# Patient Record
Sex: FEMALE | Race: BLACK OR AFRICAN AMERICAN | Employment: OTHER | URBAN - METROPOLITAN AREA
[De-identification: names, ages, dates, MRNs, and addresses within clinical notes are randomized per-mention and may not be internally consistent; named-entity substitution may affect disease eponyms.]

---

## 2017-03-25 PROBLEM — R26.9 GAIT DISORDER: Status: ACTIVE | Noted: 2017-03-25

## 2017-03-25 PROBLEM — R40.4 TRANSIENT ALTERATION OF AWARENESS: Status: ACTIVE | Noted: 2017-03-25

## 2017-03-25 PROBLEM — B97.89 VIRAL RESPIRATORY ILLNESS: Status: ACTIVE | Noted: 2017-03-25

## 2017-03-25 PROBLEM — J10.1 INFLUENZA B: Status: ACTIVE | Noted: 2017-03-25

## 2017-03-25 PROBLEM — J98.8 VIRAL RESPIRATORY ILLNESS: Status: ACTIVE | Noted: 2017-03-25

## 2017-04-29 PROBLEM — E87.20 LACTIC ACID ACIDOSIS: Status: ACTIVE | Noted: 2017-04-29

## 2017-04-29 PROBLEM — A41.89 SEPSIS DUE TO OTHER ETIOLOGY (HCC): Status: ACTIVE | Noted: 2017-04-29

## 2017-04-29 PROBLEM — N30.00 ACUTE CYSTITIS WITHOUT HEMATURIA: Status: ACTIVE | Noted: 2017-04-29

## 2018-06-04 ENCOUNTER — OFFICE VISIT (OUTPATIENT)
Dept: ENT CLINIC | Age: 83
End: 2018-06-04
Payer: COMMERCIAL

## 2018-06-04 VITALS
WEIGHT: 131.7 LBS | SYSTOLIC BLOOD PRESSURE: 153 MMHG | BODY MASS INDEX: 30.48 KG/M2 | DIASTOLIC BLOOD PRESSURE: 70 MMHG | HEIGHT: 55 IN | HEART RATE: 86 BPM

## 2018-06-04 DIAGNOSIS — C06.9 SQUAMOUS CELL CARCINOMA OF ORAL CAVITY (HCC): Primary | ICD-10-CM

## 2018-06-04 PROCEDURE — 99213 OFFICE O/P EST LOW 20 MIN: CPT | Performed by: OTOLARYNGOLOGY

## 2018-06-04 PROCEDURE — 1036F TOBACCO NON-USER: CPT | Performed by: OTOLARYNGOLOGY

## 2018-06-04 PROCEDURE — G8417 CALC BMI ABV UP PARAM F/U: HCPCS | Performed by: OTOLARYNGOLOGY

## 2018-06-04 PROCEDURE — 4040F PNEUMOC VAC/ADMIN/RCVD: CPT | Performed by: OTOLARYNGOLOGY

## 2018-06-04 PROCEDURE — 1090F PRES/ABSN URINE INCON ASSESS: CPT | Performed by: OTOLARYNGOLOGY

## 2018-06-04 PROCEDURE — 1123F ACP DISCUSS/DSCN MKR DOCD: CPT | Performed by: OTOLARYNGOLOGY

## 2018-06-04 PROCEDURE — G8427 DOCREV CUR MEDS BY ELIG CLIN: HCPCS | Performed by: OTOLARYNGOLOGY

## 2018-06-07 ASSESSMENT — ENCOUNTER SYMPTOMS
SHORTNESS OF BREATH: 0
VOMITING: 0
BLURRED VISION: 0
HEARTBURN: 0
COUGH: 0
DOUBLE VISION: 0

## 2018-06-16 ENCOUNTER — HOSPITAL ENCOUNTER (INPATIENT)
Age: 83
LOS: 4 days | Discharge: HOME HEALTH | DRG: 153 | End: 2018-06-20
Attending: EMERGENCY MEDICINE | Admitting: INTERNAL MEDICINE
Payer: COMMERCIAL

## 2018-06-16 ENCOUNTER — APPOINTMENT (OUTPATIENT)
Dept: GENERAL RADIOLOGY | Age: 83
DRG: 153 | End: 2018-06-16
Payer: COMMERCIAL

## 2018-06-16 ENCOUNTER — APPOINTMENT (OUTPATIENT)
Dept: CT IMAGING | Age: 83
DRG: 153 | End: 2018-06-16
Payer: COMMERCIAL

## 2018-06-16 DIAGNOSIS — J01.00 ACUTE NON-RECURRENT MAXILLARY SINUSITIS: Primary | ICD-10-CM

## 2018-06-16 DIAGNOSIS — R53.83 FATIGUE, UNSPECIFIED TYPE: ICD-10-CM

## 2018-06-16 PROBLEM — H70.90 MASTOIDITIS: Status: ACTIVE | Noted: 2018-06-16

## 2018-06-16 PROBLEM — J01.90 SINUSITIS, ACUTE: Status: ACTIVE | Noted: 2018-06-16

## 2018-06-16 LAB
ALBUMIN SERPL-MCNC: 2.8 G/DL (ref 3.5–5.2)
ALP BLD-CCNC: 273 U/L (ref 35–104)
ALT SERPL-CCNC: 25 U/L (ref 0–32)
ANION GAP SERPL CALCULATED.3IONS-SCNC: 16 MMOL/L (ref 7–16)
AST SERPL-CCNC: 45 U/L (ref 0–31)
BACTERIA: ABNORMAL /HPF
BASOPHILS ABSOLUTE: 0 E9/L (ref 0–0.2)
BASOPHILS RELATIVE PERCENT: 0 % (ref 0–2)
BILIRUB SERPL-MCNC: 0.5 MG/DL (ref 0–1.2)
BILIRUBIN URINE: NEGATIVE
BLOOD, URINE: NEGATIVE
BUN BLDV-MCNC: 23 MG/DL (ref 8–23)
CALCIUM SERPL-MCNC: 9.1 MG/DL (ref 8.6–10.2)
CHLORIDE BLD-SCNC: 97 MMOL/L (ref 98–107)
CLARITY: CLEAR
CO2: 23 MMOL/L (ref 22–29)
COLOR: YELLOW
CREAT SERPL-MCNC: 1.6 MG/DL (ref 0.5–1)
EKG ATRIAL RATE: 91 BPM
EKG P AXIS: 55 DEGREES
EKG P-R INTERVAL: 142 MS
EKG Q-T INTERVAL: 286 MS
EKG QRS DURATION: 80 MS
EKG QTC CALCULATION (BAZETT): 351 MS
EKG R AXIS: 20 DEGREES
EKG T AXIS: 45 DEGREES
EKG VENTRICULAR RATE: 91 BPM
EOSINOPHILS ABSOLUTE: 0 E9/L (ref 0.05–0.5)
EOSINOPHILS RELATIVE PERCENT: 0 % (ref 0–6)
EPITHELIAL CELLS, UA: ABNORMAL /HPF
GFR AFRICAN AMERICAN: 36
GFR NON-AFRICAN AMERICAN: 36 ML/MIN/1.73
GLUCOSE BLD-MCNC: 137 MG/DL (ref 74–109)
GLUCOSE URINE: NEGATIVE MG/DL
HCT VFR BLD CALC: 38.6 % (ref 34–48)
HEMOGLOBIN: 12.4 G/DL (ref 11.5–15.5)
KETONES, URINE: NEGATIVE MG/DL
LACTIC ACID: 2.9 MMOL/L (ref 0.5–2.2)
LEUKOCYTE ESTERASE, URINE: NEGATIVE
LYMPHOCYTES ABSOLUTE: 2.47 E9/L (ref 1.5–4)
LYMPHOCYTES RELATIVE PERCENT: 12 % (ref 20–42)
MCH RBC QN AUTO: 31.8 PG (ref 26–35)
MCHC RBC AUTO-ENTMCNC: 32.1 % (ref 32–34.5)
MCV RBC AUTO: 99 FL (ref 80–99.9)
MONOCYTES ABSOLUTE: 1.03 E9/L (ref 0.1–0.95)
MONOCYTES RELATIVE PERCENT: 5 % (ref 2–12)
NEUTROPHILS ABSOLUTE: 17.1 E9/L (ref 1.8–7.3)
NEUTROPHILS RELATIVE PERCENT: 83 % (ref 43–80)
NITRITE, URINE: NEGATIVE
PDW BLD-RTO: 12.2 FL (ref 11.5–15)
PH UA: 8 (ref 5–9)
PLATELET # BLD: 178 E9/L (ref 130–450)
PMV BLD AUTO: 10.6 FL (ref 7–12)
POTASSIUM SERPL-SCNC: 4.4 MMOL/L (ref 3.5–5)
PRO-BNP: 3814 PG/ML (ref 0–450)
PROTEIN UA: 100 MG/DL
RBC # BLD: 3.9 E12/L (ref 3.5–5.5)
RBC UA: ABNORMAL /HPF (ref 0–2)
SODIUM BLD-SCNC: 136 MMOL/L (ref 132–146)
SPECIFIC GRAVITY UA: 1.02 (ref 1–1.03)
TOTAL CK: 239 U/L (ref 20–180)
TOTAL PROTEIN: 8.2 G/DL (ref 6.4–8.3)
TOXIC GRANULATION: ABNORMAL
TROPONIN: <0.01 NG/ML (ref 0–0.03)
UROBILINOGEN, URINE: 0.2 E.U./DL
WBC # BLD: 20.6 E9/L (ref 4.5–11.5)
WBC UA: ABNORMAL /HPF (ref 0–5)

## 2018-06-16 PROCEDURE — 6370000000 HC RX 637 (ALT 250 FOR IP): Performed by: EMERGENCY MEDICINE

## 2018-06-16 PROCEDURE — 71045 X-RAY EXAM CHEST 1 VIEW: CPT

## 2018-06-16 PROCEDURE — 80053 COMPREHEN METABOLIC PANEL: CPT

## 2018-06-16 PROCEDURE — 36415 COLL VENOUS BLD VENIPUNCTURE: CPT

## 2018-06-16 PROCEDURE — 81001 URINALYSIS AUTO W/SCOPE: CPT

## 2018-06-16 PROCEDURE — 82550 ASSAY OF CK (CPK): CPT

## 2018-06-16 PROCEDURE — 83605 ASSAY OF LACTIC ACID: CPT

## 2018-06-16 PROCEDURE — 2580000003 HC RX 258: Performed by: NURSE PRACTITIONER

## 2018-06-16 PROCEDURE — 87040 BLOOD CULTURE FOR BACTERIA: CPT

## 2018-06-16 PROCEDURE — 84484 ASSAY OF TROPONIN QUANT: CPT

## 2018-06-16 PROCEDURE — 6360000002 HC RX W HCPCS: Performed by: EMERGENCY MEDICINE

## 2018-06-16 PROCEDURE — 70450 CT HEAD/BRAIN W/O DYE: CPT

## 2018-06-16 PROCEDURE — 2580000003 HC RX 258: Performed by: EMERGENCY MEDICINE

## 2018-06-16 PROCEDURE — 85025 COMPLETE CBC W/AUTO DIFF WBC: CPT

## 2018-06-16 PROCEDURE — 1200000000 HC SEMI PRIVATE

## 2018-06-16 PROCEDURE — 87088 URINE BACTERIA CULTURE: CPT

## 2018-06-16 PROCEDURE — 83880 ASSAY OF NATRIURETIC PEPTIDE: CPT

## 2018-06-16 PROCEDURE — 99285 EMERGENCY DEPT VISIT HI MDM: CPT

## 2018-06-16 RX ORDER — GABAPENTIN 100 MG/1
100 CAPSULE ORAL NIGHTLY
Status: DISCONTINUED | OUTPATIENT
Start: 2018-06-16 | End: 2018-06-20 | Stop reason: HOSPADM

## 2018-06-16 RX ORDER — GUAIFENESIN 600 MG/1
600 TABLET, EXTENDED RELEASE ORAL 2 TIMES DAILY
Status: DISCONTINUED | OUTPATIENT
Start: 2018-06-16 | End: 2018-06-20 | Stop reason: HOSPADM

## 2018-06-16 RX ORDER — LATANOPROST 50 UG/ML
1 SOLUTION/ DROPS OPHTHALMIC NIGHTLY
Status: DISCONTINUED | OUTPATIENT
Start: 2018-06-16 | End: 2018-06-20 | Stop reason: HOSPADM

## 2018-06-16 RX ORDER — ACETAMINOPHEN 325 MG/1
650 TABLET ORAL EVERY 4 HOURS PRN
Status: DISCONTINUED | OUTPATIENT
Start: 2018-06-16 | End: 2018-06-20 | Stop reason: HOSPADM

## 2018-06-16 RX ORDER — ACETAMINOPHEN 325 MG/1
650 TABLET ORAL ONCE
Status: COMPLETED | OUTPATIENT
Start: 2018-06-16 | End: 2018-06-16

## 2018-06-16 RX ORDER — SODIUM CHLORIDE 0.9 % (FLUSH) 0.9 %
10 SYRINGE (ML) INJECTION EVERY 12 HOURS SCHEDULED
Status: DISCONTINUED | OUTPATIENT
Start: 2018-06-16 | End: 2018-06-20 | Stop reason: HOSPADM

## 2018-06-16 RX ORDER — DOXYCYCLINE HYCLATE 100 MG/1
100 CAPSULE ORAL 2 TIMES DAILY
Status: DISCONTINUED | OUTPATIENT
Start: 2018-06-16 | End: 2018-06-17

## 2018-06-16 RX ORDER — 0.9 % SODIUM CHLORIDE 0.9 %
1000 INTRAVENOUS SOLUTION INTRAVENOUS ONCE
Status: COMPLETED | OUTPATIENT
Start: 2018-06-16 | End: 2018-06-16

## 2018-06-16 RX ORDER — SODIUM CHLORIDE 0.9 % (FLUSH) 0.9 %
10 SYRINGE (ML) INJECTION PRN
Status: DISCONTINUED | OUTPATIENT
Start: 2018-06-16 | End: 2018-06-20 | Stop reason: HOSPADM

## 2018-06-16 RX ORDER — PANTOPRAZOLE SODIUM 40 MG/1
40 TABLET, DELAYED RELEASE ORAL
Status: DISCONTINUED | OUTPATIENT
Start: 2018-06-17 | End: 2018-06-20 | Stop reason: HOSPADM

## 2018-06-16 RX ADMIN — Medication 10 ML: at 23:16

## 2018-06-16 RX ADMIN — ACETAMINOPHEN 650 MG: 325 TABLET ORAL at 16:05

## 2018-06-16 RX ADMIN — DEXTROSE MONOHYDRATE 1 G: 5 INJECTION INTRAVENOUS at 21:26

## 2018-06-16 RX ADMIN — SODIUM CHLORIDE: 900 INJECTION, SOLUTION INTRAVENOUS at 22:40

## 2018-06-16 RX ADMIN — SODIUM CHLORIDE 1000 ML: 9 INJECTION, SOLUTION INTRAVENOUS at 21:26

## 2018-06-16 ASSESSMENT — ENCOUNTER SYMPTOMS
SINUS PRESSURE: 0
CHEST TIGHTNESS: 0
ABDOMINAL DISTENTION: 0
SORE THROAT: 0
ABDOMINAL PAIN: 0
SHORTNESS OF BREATH: 0
DIARRHEA: 0
NAUSEA: 0
VOMITING: 0
WHEEZING: 0
BACK PAIN: 0
COUGH: 1

## 2018-06-17 LAB
ALBUMIN SERPL-MCNC: 2.8 G/DL (ref 3.5–5.2)
ALP BLD-CCNC: 245 U/L (ref 35–104)
ALT SERPL-CCNC: 21 U/L (ref 0–32)
ANION GAP SERPL CALCULATED.3IONS-SCNC: 10 MMOL/L (ref 7–16)
AST SERPL-CCNC: 37 U/L (ref 0–31)
BASOPHILS ABSOLUTE: 0.03 E9/L (ref 0–0.2)
BASOPHILS RELATIVE PERCENT: 0.2 % (ref 0–2)
BILIRUB SERPL-MCNC: 0.5 MG/DL (ref 0–1.2)
BUN BLDV-MCNC: 26 MG/DL (ref 8–23)
CALCIUM SERPL-MCNC: 9 MG/DL (ref 8.6–10.2)
CHLORIDE BLD-SCNC: 102 MMOL/L (ref 98–107)
CO2: 26 MMOL/L (ref 22–29)
CREAT SERPL-MCNC: 1.6 MG/DL (ref 0.5–1)
EOSINOPHILS ABSOLUTE: 0.03 E9/L (ref 0.05–0.5)
EOSINOPHILS RELATIVE PERCENT: 0.2 % (ref 0–6)
GFR AFRICAN AMERICAN: 36
GFR NON-AFRICAN AMERICAN: 36 ML/MIN/1.73
GLUCOSE BLD-MCNC: 97 MG/DL (ref 74–109)
HCT VFR BLD CALC: 37.2 % (ref 34–48)
HEMOGLOBIN: 12.1 G/DL (ref 11.5–15.5)
IMMATURE GRANULOCYTES #: 0.03 E9/L
IMMATURE GRANULOCYTES %: 0.2 % (ref 0–5)
LACTIC ACID: 1 MMOL/L (ref 0.5–2.2)
LYMPHOCYTES ABSOLUTE: 3.67 E9/L (ref 1.5–4)
LYMPHOCYTES RELATIVE PERCENT: 24.2 % (ref 20–42)
MCH RBC QN AUTO: 32.2 PG (ref 26–35)
MCHC RBC AUTO-ENTMCNC: 32.5 % (ref 32–34.5)
MCV RBC AUTO: 98.9 FL (ref 80–99.9)
MONOCYTES ABSOLUTE: 1.4 E9/L (ref 0.1–0.95)
MONOCYTES RELATIVE PERCENT: 9.2 % (ref 2–12)
NEUTROPHILS ABSOLUTE: 10.03 E9/L (ref 1.8–7.3)
NEUTROPHILS RELATIVE PERCENT: 66 % (ref 43–80)
PDW BLD-RTO: 12.3 FL (ref 11.5–15)
PLATELET # BLD: 166 E9/L (ref 130–450)
PMV BLD AUTO: 10.8 FL (ref 7–12)
POTASSIUM SERPL-SCNC: 4 MMOL/L (ref 3.5–5)
RBC # BLD: 3.76 E12/L (ref 3.5–5.5)
SODIUM BLD-SCNC: 138 MMOL/L (ref 132–146)
TOTAL PROTEIN: 8.1 G/DL (ref 6.4–8.3)
WBC # BLD: 15.2 E9/L (ref 4.5–11.5)

## 2018-06-17 PROCEDURE — 80053 COMPREHEN METABOLIC PANEL: CPT

## 2018-06-17 PROCEDURE — 36415 COLL VENOUS BLD VENIPUNCTURE: CPT

## 2018-06-17 PROCEDURE — 6370000000 HC RX 637 (ALT 250 FOR IP): Performed by: NURSE PRACTITIONER

## 2018-06-17 PROCEDURE — 1200000000 HC SEMI PRIVATE

## 2018-06-17 PROCEDURE — 85025 COMPLETE CBC W/AUTO DIFF WBC: CPT

## 2018-06-17 PROCEDURE — 6360000002 HC RX W HCPCS: Performed by: NURSE PRACTITIONER

## 2018-06-17 PROCEDURE — 6370000000 HC RX 637 (ALT 250 FOR IP): Performed by: SPECIALIST

## 2018-06-17 PROCEDURE — 83605 ASSAY OF LACTIC ACID: CPT

## 2018-06-17 PROCEDURE — 2580000003 HC RX 258: Performed by: NURSE PRACTITIONER

## 2018-06-17 PROCEDURE — 87081 CULTURE SCREEN ONLY: CPT

## 2018-06-17 RX ORDER — CIPROFLOXACIN 500 MG/1
750 TABLET, FILM COATED ORAL
Status: DISCONTINUED | OUTPATIENT
Start: 2018-06-17 | End: 2018-06-20 | Stop reason: HOSPADM

## 2018-06-17 RX ORDER — CIPROFLOXACIN AND DEXAMETHASONE 3; 1 MG/ML; MG/ML
4 SUSPENSION/ DROPS AURICULAR (OTIC) 2 TIMES DAILY
Status: DISCONTINUED | OUTPATIENT
Start: 2018-06-17 | End: 2018-06-20 | Stop reason: HOSPADM

## 2018-06-17 RX ADMIN — ENOXAPARIN SODIUM 30 MG: 30 INJECTION SUBCUTANEOUS at 10:43

## 2018-06-17 RX ADMIN — VANCOMYCIN HYDROCHLORIDE 1000 MG: 1 INJECTION, POWDER, LYOPHILIZED, FOR SOLUTION INTRAVENOUS at 10:19

## 2018-06-17 RX ADMIN — PANTOPRAZOLE SODIUM 40 MG: 40 TABLET, DELAYED RELEASE ORAL at 06:05

## 2018-06-17 RX ADMIN — LATANOPROST 1 DROP: 50 SOLUTION OPHTHALMIC at 00:32

## 2018-06-17 RX ADMIN — GUAIFENESIN 600 MG: 600 TABLET, EXTENDED RELEASE ORAL at 10:42

## 2018-06-17 RX ADMIN — GUAIFENESIN 600 MG: 600 TABLET, EXTENDED RELEASE ORAL at 20:26

## 2018-06-17 RX ADMIN — CIPROFLOXACIN 750 MG: 500 TABLET, FILM COATED ORAL at 15:05

## 2018-06-17 RX ADMIN — CIPROFLOXACIN AND DEXAMETHASONE 4 DROP: 3; 1 SUSPENSION/ DROPS AURICULAR (OTIC) at 10:43

## 2018-06-17 RX ADMIN — LATANOPROST 1 DROP: 50 SOLUTION OPHTHALMIC at 20:26

## 2018-06-17 RX ADMIN — CIPROFLOXACIN AND DEXAMETHASONE 4 DROP: 3; 1 SUSPENSION/ DROPS AURICULAR (OTIC) at 20:26

## 2018-06-17 RX ADMIN — GABAPENTIN 100 MG: 100 CAPSULE ORAL at 20:26

## 2018-06-17 ASSESSMENT — PAIN SCALES - GENERAL
PAINLEVEL_OUTOF10: 0
PAINLEVEL_OUTOF10: 0

## 2018-06-18 LAB
ALBUMIN SERPL-MCNC: 2.6 G/DL (ref 3.5–5.2)
ALP BLD-CCNC: 211 U/L (ref 35–104)
ALT SERPL-CCNC: 16 U/L (ref 0–32)
ANION GAP SERPL CALCULATED.3IONS-SCNC: 11 MMOL/L (ref 7–16)
AST SERPL-CCNC: 29 U/L (ref 0–31)
BASOPHILS ABSOLUTE: 0.02 E9/L (ref 0–0.2)
BASOPHILS RELATIVE PERCENT: 0.3 % (ref 0–2)
BILIRUB SERPL-MCNC: 0.6 MG/DL (ref 0–1.2)
BUN BLDV-MCNC: 23 MG/DL (ref 8–23)
CALCIUM SERPL-MCNC: 8.5 MG/DL (ref 8.6–10.2)
CHLORIDE BLD-SCNC: 104 MMOL/L (ref 98–107)
CO2: 24 MMOL/L (ref 22–29)
CREAT SERPL-MCNC: 1.5 MG/DL (ref 0.5–1)
EOSINOPHILS ABSOLUTE: 0.1 E9/L (ref 0.05–0.5)
EOSINOPHILS RELATIVE PERCENT: 1.5 % (ref 0–6)
GFR AFRICAN AMERICAN: 39
GFR NON-AFRICAN AMERICAN: 39 ML/MIN/1.73
GLUCOSE BLD-MCNC: 81 MG/DL (ref 74–109)
HCT VFR BLD CALC: 36.2 % (ref 34–48)
HEMOGLOBIN: 11.5 G/DL (ref 11.5–15.5)
IMMATURE GRANULOCYTES #: 0.01 E9/L
IMMATURE GRANULOCYTES %: 0.2 % (ref 0–5)
LYMPHOCYTES ABSOLUTE: 2.56 E9/L (ref 1.5–4)
LYMPHOCYTES RELATIVE PERCENT: 38.6 % (ref 20–42)
MCH RBC QN AUTO: 31.8 PG (ref 26–35)
MCHC RBC AUTO-ENTMCNC: 31.8 % (ref 32–34.5)
MCV RBC AUTO: 100 FL (ref 80–99.9)
MONOCYTES ABSOLUTE: 0.73 E9/L (ref 0.1–0.95)
MONOCYTES RELATIVE PERCENT: 11 % (ref 2–12)
NEUTROPHILS ABSOLUTE: 3.21 E9/L (ref 1.8–7.3)
NEUTROPHILS RELATIVE PERCENT: 48.4 % (ref 43–80)
PDW BLD-RTO: 12 FL (ref 11.5–15)
PLATELET # BLD: 167 E9/L (ref 130–450)
PMV BLD AUTO: 11.1 FL (ref 7–12)
POTASSIUM SERPL-SCNC: 4.1 MMOL/L (ref 3.5–5)
RBC # BLD: 3.62 E12/L (ref 3.5–5.5)
SODIUM BLD-SCNC: 139 MMOL/L (ref 132–146)
TOTAL PROTEIN: 7.3 G/DL (ref 6.4–8.3)
URINE CULTURE, ROUTINE: NORMAL
VANCOMYCIN RANDOM: 10.7 MCG/ML (ref 5–40)
WBC # BLD: 6.6 E9/L (ref 4.5–11.5)

## 2018-06-18 PROCEDURE — 80053 COMPREHEN METABOLIC PANEL: CPT

## 2018-06-18 PROCEDURE — 6360000002 HC RX W HCPCS: Performed by: NURSE PRACTITIONER

## 2018-06-18 PROCEDURE — G8987 SELF CARE CURRENT STATUS: HCPCS

## 2018-06-18 PROCEDURE — 6360000002 HC RX W HCPCS: Performed by: SPECIALIST

## 2018-06-18 PROCEDURE — 2580000003 HC RX 258: Performed by: SPECIALIST

## 2018-06-18 PROCEDURE — G8979 MOBILITY GOAL STATUS: HCPCS | Performed by: PHYSICAL THERAPIST

## 2018-06-18 PROCEDURE — G8978 MOBILITY CURRENT STATUS: HCPCS | Performed by: PHYSICAL THERAPIST

## 2018-06-18 PROCEDURE — 36415 COLL VENOUS BLD VENIPUNCTURE: CPT

## 2018-06-18 PROCEDURE — 97161 PT EVAL LOW COMPLEX 20 MIN: CPT | Performed by: PHYSICAL THERAPIST

## 2018-06-18 PROCEDURE — 85025 COMPLETE CBC W/AUTO DIFF WBC: CPT

## 2018-06-18 PROCEDURE — 6370000000 HC RX 637 (ALT 250 FOR IP): Performed by: SPECIALIST

## 2018-06-18 PROCEDURE — 6370000000 HC RX 637 (ALT 250 FOR IP): Performed by: NURSE PRACTITIONER

## 2018-06-18 PROCEDURE — 97165 OT EVAL LOW COMPLEX 30 MIN: CPT

## 2018-06-18 PROCEDURE — 99221 1ST HOSP IP/OBS SF/LOW 40: CPT | Performed by: OTOLARYNGOLOGY

## 2018-06-18 PROCEDURE — 97530 THERAPEUTIC ACTIVITIES: CPT

## 2018-06-18 PROCEDURE — 80202 ASSAY OF VANCOMYCIN: CPT

## 2018-06-18 PROCEDURE — 97530 THERAPEUTIC ACTIVITIES: CPT | Performed by: PHYSICAL THERAPIST

## 2018-06-18 PROCEDURE — G8988 SELF CARE GOAL STATUS: HCPCS

## 2018-06-18 PROCEDURE — 1200000000 HC SEMI PRIVATE

## 2018-06-18 RX ADMIN — CIPROFLOXACIN 750 MG: 500 TABLET, FILM COATED ORAL at 09:46

## 2018-06-18 RX ADMIN — PANTOPRAZOLE SODIUM 40 MG: 40 TABLET, DELAYED RELEASE ORAL at 07:00

## 2018-06-18 RX ADMIN — LATANOPROST 1 DROP: 50 SOLUTION OPHTHALMIC at 19:54

## 2018-06-18 RX ADMIN — ENOXAPARIN SODIUM 30 MG: 30 INJECTION SUBCUTANEOUS at 09:46

## 2018-06-18 RX ADMIN — VANCOMYCIN HYDROCHLORIDE 1000 MG: 1 INJECTION, POWDER, LYOPHILIZED, FOR SOLUTION INTRAVENOUS at 10:45

## 2018-06-18 RX ADMIN — GUAIFENESIN 600 MG: 600 TABLET, EXTENDED RELEASE ORAL at 09:47

## 2018-06-18 RX ADMIN — CIPROFLOXACIN AND DEXAMETHASONE 4 DROP: 3; 1 SUSPENSION/ DROPS AURICULAR (OTIC) at 07:06

## 2018-06-18 RX ADMIN — GABAPENTIN 100 MG: 100 CAPSULE ORAL at 19:55

## 2018-06-18 RX ADMIN — CIPROFLOXACIN AND DEXAMETHASONE 4 DROP: 3; 1 SUSPENSION/ DROPS AURICULAR (OTIC) at 19:54

## 2018-06-18 RX ADMIN — GUAIFENESIN 600 MG: 600 TABLET, EXTENDED RELEASE ORAL at 19:55

## 2018-06-18 ASSESSMENT — ENCOUNTER SYMPTOMS
GASTROINTESTINAL NEGATIVE: 1
SPUTUM PRODUCTION: 0
NAUSEA: 0
SINUS PAIN: 0
RESPIRATORY NEGATIVE: 1
VOMITING: 0

## 2018-06-18 ASSESSMENT — PAIN SCALES - GENERAL
PAINLEVEL_OUTOF10: 0

## 2018-06-19 LAB
ALBUMIN SERPL-MCNC: 2.5 G/DL (ref 3.5–5.2)
ALP BLD-CCNC: 206 U/L (ref 35–104)
ALT SERPL-CCNC: 16 U/L (ref 0–32)
ANION GAP SERPL CALCULATED.3IONS-SCNC: 10 MMOL/L (ref 7–16)
AST SERPL-CCNC: 33 U/L (ref 0–31)
BASOPHILS ABSOLUTE: 0.02 E9/L (ref 0–0.2)
BASOPHILS RELATIVE PERCENT: 0.4 % (ref 0–2)
BILIRUB SERPL-MCNC: 0.4 MG/DL (ref 0–1.2)
BUN BLDV-MCNC: 22 MG/DL (ref 8–23)
CALCIUM SERPL-MCNC: 8.5 MG/DL (ref 8.6–10.2)
CHLORIDE BLD-SCNC: 105 MMOL/L (ref 98–107)
CO2: 24 MMOL/L (ref 22–29)
CREAT SERPL-MCNC: 1.5 MG/DL (ref 0.5–1)
EOSINOPHILS ABSOLUTE: 0.15 E9/L (ref 0.05–0.5)
EOSINOPHILS RELATIVE PERCENT: 3.3 % (ref 0–6)
GFR AFRICAN AMERICAN: 39
GFR NON-AFRICAN AMERICAN: 39 ML/MIN/1.73
GLUCOSE BLD-MCNC: 79 MG/DL (ref 74–109)
HCT VFR BLD CALC: 35.3 % (ref 34–48)
HEMOGLOBIN: 11.4 G/DL (ref 11.5–15.5)
IMMATURE GRANULOCYTES #: 0.01 E9/L
IMMATURE GRANULOCYTES %: 0.2 % (ref 0–5)
LYMPHOCYTES ABSOLUTE: 1.88 E9/L (ref 1.5–4)
LYMPHOCYTES RELATIVE PERCENT: 41.2 % (ref 20–42)
MCH RBC QN AUTO: 32 PG (ref 26–35)
MCHC RBC AUTO-ENTMCNC: 32.3 % (ref 32–34.5)
MCV RBC AUTO: 99.2 FL (ref 80–99.9)
MONOCYTES ABSOLUTE: 0.59 E9/L (ref 0.1–0.95)
MONOCYTES RELATIVE PERCENT: 12.9 % (ref 2–12)
MRSA CULTURE ONLY: NORMAL
NEUTROPHILS ABSOLUTE: 1.91 E9/L (ref 1.8–7.3)
NEUTROPHILS RELATIVE PERCENT: 42 % (ref 43–80)
PDW BLD-RTO: 11.9 FL (ref 11.5–15)
PLATELET # BLD: 181 E9/L (ref 130–450)
PMV BLD AUTO: 10.8 FL (ref 7–12)
POTASSIUM SERPL-SCNC: 3.9 MMOL/L (ref 3.5–5)
RBC # BLD: 3.56 E12/L (ref 3.5–5.5)
SODIUM BLD-SCNC: 139 MMOL/L (ref 132–146)
TOTAL PROTEIN: 7.4 G/DL (ref 6.4–8.3)
WBC # BLD: 4.6 E9/L (ref 4.5–11.5)

## 2018-06-19 PROCEDURE — 97110 THERAPEUTIC EXERCISES: CPT

## 2018-06-19 PROCEDURE — 97535 SELF CARE MNGMENT TRAINING: CPT

## 2018-06-19 PROCEDURE — 6370000000 HC RX 637 (ALT 250 FOR IP): Performed by: NURSE PRACTITIONER

## 2018-06-19 PROCEDURE — 6370000000 HC RX 637 (ALT 250 FOR IP): Performed by: SPECIALIST

## 2018-06-19 PROCEDURE — 80053 COMPREHEN METABOLIC PANEL: CPT

## 2018-06-19 PROCEDURE — 36415 COLL VENOUS BLD VENIPUNCTURE: CPT

## 2018-06-19 PROCEDURE — 6360000002 HC RX W HCPCS: Performed by: NURSE PRACTITIONER

## 2018-06-19 PROCEDURE — 1200000000 HC SEMI PRIVATE

## 2018-06-19 PROCEDURE — 85025 COMPLETE CBC W/AUTO DIFF WBC: CPT

## 2018-06-19 PROCEDURE — 97116 GAIT TRAINING THERAPY: CPT

## 2018-06-19 PROCEDURE — 97530 THERAPEUTIC ACTIVITIES: CPT

## 2018-06-19 RX ADMIN — PANTOPRAZOLE SODIUM 40 MG: 40 TABLET, DELAYED RELEASE ORAL at 06:44

## 2018-06-19 RX ADMIN — GUAIFENESIN 600 MG: 600 TABLET, EXTENDED RELEASE ORAL at 21:56

## 2018-06-19 RX ADMIN — CIPROFLOXACIN 750 MG: 500 TABLET, FILM COATED ORAL at 11:04

## 2018-06-19 RX ADMIN — GABAPENTIN 100 MG: 100 CAPSULE ORAL at 21:56

## 2018-06-19 RX ADMIN — ENOXAPARIN SODIUM 30 MG: 30 INJECTION SUBCUTANEOUS at 11:09

## 2018-06-19 RX ADMIN — GUAIFENESIN 600 MG: 600 TABLET, EXTENDED RELEASE ORAL at 11:04

## 2018-06-19 RX ADMIN — LATANOPROST 1 DROP: 50 SOLUTION OPHTHALMIC at 21:56

## 2018-06-19 RX ADMIN — CIPROFLOXACIN AND DEXAMETHASONE 4 DROP: 3; 1 SUSPENSION/ DROPS AURICULAR (OTIC) at 11:08

## 2018-06-19 RX ADMIN — CIPROFLOXACIN AND DEXAMETHASONE 4 DROP: 3; 1 SUSPENSION/ DROPS AURICULAR (OTIC) at 21:56

## 2018-06-19 ASSESSMENT — PAIN SCALES - GENERAL
PAINLEVEL_OUTOF10: 0

## 2018-06-20 VITALS
SYSTOLIC BLOOD PRESSURE: 141 MMHG | HEART RATE: 75 BPM | WEIGHT: 130.9 LBS | OXYGEN SATURATION: 95 % | DIASTOLIC BLOOD PRESSURE: 60 MMHG | BODY MASS INDEX: 30.42 KG/M2 | RESPIRATION RATE: 16 BRPM | TEMPERATURE: 97.4 F

## 2018-06-20 LAB
ALBUMIN SERPL-MCNC: 2.6 G/DL (ref 3.5–5.2)
ALP BLD-CCNC: 216 U/L (ref 35–104)
ALT SERPL-CCNC: 20 U/L (ref 0–32)
ANION GAP SERPL CALCULATED.3IONS-SCNC: 10 MMOL/L (ref 7–16)
AST SERPL-CCNC: 41 U/L (ref 0–31)
BASOPHILS ABSOLUTE: 0.02 E9/L (ref 0–0.2)
BASOPHILS RELATIVE PERCENT: 0.5 % (ref 0–2)
BILIRUB SERPL-MCNC: 0.3 MG/DL (ref 0–1.2)
BUN BLDV-MCNC: 19 MG/DL (ref 8–23)
CALCIUM SERPL-MCNC: 8.7 MG/DL (ref 8.6–10.2)
CHLORIDE BLD-SCNC: 104 MMOL/L (ref 98–107)
CO2: 24 MMOL/L (ref 22–29)
CREAT SERPL-MCNC: 1.4 MG/DL (ref 0.5–1)
EOSINOPHILS ABSOLUTE: 0.21 E9/L (ref 0.05–0.5)
EOSINOPHILS RELATIVE PERCENT: 5.1 % (ref 0–6)
GFR AFRICAN AMERICAN: 42
GFR NON-AFRICAN AMERICAN: 42 ML/MIN/1.73
GLUCOSE BLD-MCNC: 85 MG/DL (ref 74–109)
HCT VFR BLD CALC: 35.7 % (ref 34–48)
HEMOGLOBIN: 11.6 G/DL (ref 11.5–15.5)
IMMATURE GRANULOCYTES #: 0.02 E9/L
IMMATURE GRANULOCYTES %: 0.5 % (ref 0–5)
LYMPHOCYTES ABSOLUTE: 2.02 E9/L (ref 1.5–4)
LYMPHOCYTES RELATIVE PERCENT: 49 % (ref 20–42)
MCH RBC QN AUTO: 31.8 PG (ref 26–35)
MCHC RBC AUTO-ENTMCNC: 32.5 % (ref 32–34.5)
MCV RBC AUTO: 97.8 FL (ref 80–99.9)
MONOCYTES ABSOLUTE: 0.6 E9/L (ref 0.1–0.95)
MONOCYTES RELATIVE PERCENT: 14.6 % (ref 2–12)
NEUTROPHILS ABSOLUTE: 1.25 E9/L (ref 1.8–7.3)
NEUTROPHILS RELATIVE PERCENT: 30.3 % (ref 43–80)
PDW BLD-RTO: 11.9 FL (ref 11.5–15)
PLATELET # BLD: 180 E9/L (ref 130–450)
PMV BLD AUTO: 10.4 FL (ref 7–12)
POTASSIUM SERPL-SCNC: 4.2 MMOL/L (ref 3.5–5)
RBC # BLD: 3.65 E12/L (ref 3.5–5.5)
SODIUM BLD-SCNC: 138 MMOL/L (ref 132–146)
TOTAL PROTEIN: 7.4 G/DL (ref 6.4–8.3)
WBC # BLD: 4.1 E9/L (ref 4.5–11.5)

## 2018-06-20 PROCEDURE — 6370000000 HC RX 637 (ALT 250 FOR IP): Performed by: SPECIALIST

## 2018-06-20 PROCEDURE — 99232 SBSQ HOSP IP/OBS MODERATE 35: CPT | Performed by: OTOLARYNGOLOGY

## 2018-06-20 PROCEDURE — 6360000002 HC RX W HCPCS: Performed by: NURSE PRACTITIONER

## 2018-06-20 PROCEDURE — 97116 GAIT TRAINING THERAPY: CPT

## 2018-06-20 PROCEDURE — 80053 COMPREHEN METABOLIC PANEL: CPT

## 2018-06-20 PROCEDURE — 36415 COLL VENOUS BLD VENIPUNCTURE: CPT

## 2018-06-20 PROCEDURE — 6370000000 HC RX 637 (ALT 250 FOR IP): Performed by: NURSE PRACTITIONER

## 2018-06-20 PROCEDURE — 85025 COMPLETE CBC W/AUTO DIFF WBC: CPT

## 2018-06-20 RX ORDER — CIPROFLOXACIN AND DEXAMETHASONE 3; 1 MG/ML; MG/ML
4 SUSPENSION/ DROPS AURICULAR (OTIC) 2 TIMES DAILY
Qty: 1 BOTTLE | Refills: 0 | Status: SHIPPED | OUTPATIENT
Start: 2018-06-20 | End: 2018-06-27

## 2018-06-20 RX ORDER — CIPROFLOXACIN 750 MG/1
750 TABLET, FILM COATED ORAL
Qty: 30 TABLET | Refills: 0 | Status: SHIPPED | OUTPATIENT
Start: 2018-06-21 | End: 2018-07-21

## 2018-06-20 RX ORDER — ACETAMINOPHEN 325 MG/1
650 TABLET ORAL EVERY 4 HOURS PRN
Qty: 120 TABLET | Refills: 0 | Status: SHIPPED | OUTPATIENT
Start: 2018-06-20 | End: 2018-09-10 | Stop reason: ALTCHOICE

## 2018-06-20 RX ADMIN — PANTOPRAZOLE SODIUM 40 MG: 40 TABLET, DELAYED RELEASE ORAL at 07:00

## 2018-06-20 RX ADMIN — GUAIFENESIN 600 MG: 600 TABLET, EXTENDED RELEASE ORAL at 11:25

## 2018-06-20 RX ADMIN — ENOXAPARIN SODIUM 30 MG: 30 INJECTION SUBCUTANEOUS at 11:24

## 2018-06-20 RX ADMIN — CIPROFLOXACIN AND DEXAMETHASONE 4 DROP: 3; 1 SUSPENSION/ DROPS AURICULAR (OTIC) at 11:25

## 2018-06-20 RX ADMIN — CIPROFLOXACIN 750 MG: 500 TABLET, FILM COATED ORAL at 11:24

## 2018-06-20 ASSESSMENT — PAIN SCALES - GENERAL: PAINLEVEL_OUTOF10: 0

## 2018-06-21 LAB — BLOOD CULTURE, ROUTINE: NORMAL

## 2018-06-25 ENCOUNTER — OFFICE VISIT (OUTPATIENT)
Dept: ENT CLINIC | Age: 83
End: 2018-06-25
Payer: COMMERCIAL

## 2018-06-25 VITALS
BODY MASS INDEX: 29.77 KG/M2 | SYSTOLIC BLOOD PRESSURE: 145 MMHG | HEART RATE: 64 BPM | WEIGHT: 128.1 LBS | DIASTOLIC BLOOD PRESSURE: 52 MMHG

## 2018-06-25 DIAGNOSIS — H60.319 ACUTE DIFFUSE OTITIS EXTERNA, UNSPECIFIED LATERALITY: Primary | ICD-10-CM

## 2018-06-25 PROCEDURE — 1123F ACP DISCUSS/DSCN MKR DOCD: CPT | Performed by: OTOLARYNGOLOGY

## 2018-06-25 PROCEDURE — 99213 OFFICE O/P EST LOW 20 MIN: CPT | Performed by: OTOLARYNGOLOGY

## 2018-06-25 PROCEDURE — 1111F DSCHRG MED/CURRENT MED MERGE: CPT | Performed by: OTOLARYNGOLOGY

## 2018-06-25 PROCEDURE — 4040F PNEUMOC VAC/ADMIN/RCVD: CPT | Performed by: OTOLARYNGOLOGY

## 2018-06-25 PROCEDURE — G8417 CALC BMI ABV UP PARAM F/U: HCPCS | Performed by: OTOLARYNGOLOGY

## 2018-06-25 PROCEDURE — 4130F TOPICAL PREP RX AOE: CPT | Performed by: OTOLARYNGOLOGY

## 2018-06-25 PROCEDURE — 1090F PRES/ABSN URINE INCON ASSESS: CPT | Performed by: OTOLARYNGOLOGY

## 2018-06-25 PROCEDURE — G8427 DOCREV CUR MEDS BY ELIG CLIN: HCPCS | Performed by: OTOLARYNGOLOGY

## 2018-06-25 PROCEDURE — 1036F TOBACCO NON-USER: CPT | Performed by: OTOLARYNGOLOGY

## 2018-06-25 ASSESSMENT — ENCOUNTER SYMPTOMS
CHOKING: 0
COUGH: 0
SINUS PAIN: 0
RHINORRHEA: 0

## 2018-06-25 NOTE — PROGRESS NOTES
Subjective:      Patient ID:  Regulo Palma is a 80 y.o. female. HPI:    Patient presents today for f/u of R otitis externa infection treated in hospital while admitted. She states right ear is improved and asymptomatic. She continues to use Cipro drops (4gtt BID). Review of Systems   Constitutional: Negative for activity change and fever. HENT: Negative for congestion, drooling, ear discharge, ear pain, postnasal drip, rhinorrhea and sinus pain. Respiratory: Negative for cough and choking. Objective:   Physical Exam   Constitutional: She is oriented to person, place, and time. She appears well-developed and well-nourished. No distress. HENT:   Head: Normocephalic. Right Ear: External ear normal. Decreased hearing is noted. Left Ear: External ear and ear canal normal.   Ears:    Mouth/Throat:       R debris in canal.   Eyes: Conjunctivae and EOM are normal. Pupils are equal, round, and reactive to light. Neck: Normal range of motion. Neck supple. Cardiovascular: Normal rate, regular rhythm, normal heart sounds and intact distal pulses. Pulmonary/Chest: Effort normal and breath sounds normal.   Abdominal: Soft. Bowel sounds are normal.   Musculoskeletal: Normal range of motion. Neurological: She is alert and oriented to person, place, and time. Skin: Skin is warm and dry. Psychiatric: She has a normal mood and affect. Her behavior is normal. Judgment and thought content normal.   Nursing note and vitals reviewed. Plan:      Finish course of ear drops. Keep ear protected from water with cotton ball. Restart ear drops if pain re-occurs or drainage from the ear is noticed. Follow up in 6 months or as needed if symptoms worsen. Dr. Destinee Rivera D.O.  Ms. Matthew Dalal.  Otolaryngology Facial Plastic Surgery  : [facility] Otolaryngology/Facial Plastic Surgery Residency    Associate Clinical Professor: [facility]

## 2018-07-06 ENCOUNTER — HOSPITAL ENCOUNTER (OUTPATIENT)
Age: 83
Discharge: HOME | End: 2018-07-06
Payer: COMMERCIAL

## 2018-07-06 DIAGNOSIS — H70.91 MASTOIDITIS OF RIGHT SIDE: ICD-10-CM

## 2018-07-06 LAB
ALBUMIN SERPL-MCNC: 3.1 G/DL (ref 3.5–5.2)
ALP BLD-CCNC: 212 U/L (ref 35–104)
ALT SERPL-CCNC: 36 U/L (ref 0–32)
ANION GAP SERPL CALCULATED.3IONS-SCNC: 14 MMOL/L (ref 7–16)
AST SERPL-CCNC: 63 U/L (ref 0–31)
BASOPHILS ABSOLUTE: 0.02 E9/L (ref 0–0.2)
BASOPHILS RELATIVE PERCENT: 0.4 % (ref 0–2)
BILIRUB SERPL-MCNC: 0.2 MG/DL (ref 0–1.2)
BUN BLDV-MCNC: 18 MG/DL (ref 8–23)
C-REACTIVE PROTEIN: 0.6 MG/DL (ref 0–0.4)
CALCIUM SERPL-MCNC: 9.4 MG/DL (ref 8.6–10.2)
CHLORIDE BLD-SCNC: 100 MMOL/L (ref 98–107)
CO2: 25 MMOL/L (ref 22–29)
CREAT SERPL-MCNC: 1.9 MG/DL (ref 0.5–1)
EOSINOPHILS ABSOLUTE: 0.19 E9/L (ref 0.05–0.5)
EOSINOPHILS RELATIVE PERCENT: 3.8 % (ref 0–6)
GFR AFRICAN AMERICAN: 29
GFR NON-AFRICAN AMERICAN: 29 ML/MIN/1.73
GLUCOSE BLD-MCNC: 112 MG/DL (ref 74–109)
HCT VFR BLD CALC: 39.8 % (ref 34–48)
HEMOGLOBIN: 12.5 G/DL (ref 11.5–15.5)
IMMATURE GRANULOCYTES #: 0 E9/L
IMMATURE GRANULOCYTES %: 0 % (ref 0–5)
LYMPHOCYTES ABSOLUTE: 2.86 E9/L (ref 1.5–4)
LYMPHOCYTES RELATIVE PERCENT: 56.9 % (ref 20–42)
MCH RBC QN AUTO: 31.9 PG (ref 26–35)
MCHC RBC AUTO-ENTMCNC: 31.4 % (ref 32–34.5)
MCV RBC AUTO: 101.5 FL (ref 80–99.9)
MONOCYTES ABSOLUTE: 0.52 E9/L (ref 0.1–0.95)
MONOCYTES RELATIVE PERCENT: 10.3 % (ref 2–12)
NEUTROPHILS ABSOLUTE: 1.44 E9/L (ref 1.8–7.3)
NEUTROPHILS RELATIVE PERCENT: 28.6 % (ref 43–80)
PDW BLD-RTO: 12.1 FL (ref 11.5–15)
PLATELET # BLD: 196 E9/L (ref 130–450)
PMV BLD AUTO: 9.9 FL (ref 7–12)
POTASSIUM SERPL-SCNC: 4.7 MMOL/L (ref 3.5–5)
RBC # BLD: 3.92 E12/L (ref 3.5–5.5)
SEDIMENTATION RATE, ERYTHROCYTE: 98 MM/HR (ref 0–20)
SODIUM BLD-SCNC: 139 MMOL/L (ref 132–146)
TOTAL PROTEIN: 7.7 G/DL (ref 6.4–8.3)
WBC # BLD: 5 E9/L (ref 4.5–11.5)

## 2018-07-06 PROCEDURE — 86140 C-REACTIVE PROTEIN: CPT

## 2018-07-06 PROCEDURE — 85651 RBC SED RATE NONAUTOMATED: CPT

## 2018-07-06 PROCEDURE — 85025 COMPLETE CBC W/AUTO DIFF WBC: CPT

## 2018-07-06 PROCEDURE — 80053 COMPREHEN METABOLIC PANEL: CPT

## 2018-07-06 PROCEDURE — 36415 COLL VENOUS BLD VENIPUNCTURE: CPT

## 2018-08-02 ENCOUNTER — OFFICE VISIT (OUTPATIENT)
Dept: PALLATIVE CARE | Age: 83
End: 2018-08-02
Payer: COMMERCIAL

## 2018-08-02 VITALS — DIASTOLIC BLOOD PRESSURE: 64 MMHG | BODY MASS INDEX: 26.46 KG/M2 | WEIGHT: 131 LBS | SYSTOLIC BLOOD PRESSURE: 130 MMHG

## 2018-08-02 DIAGNOSIS — Z51.5 PALLIATIVE CARE BY SPECIALIST: ICD-10-CM

## 2018-08-02 DIAGNOSIS — C06.9 SQUAMOUS CELL CARCINOMA OF ORAL CAVITY (HCC): Primary | ICD-10-CM

## 2018-08-02 PROCEDURE — G8428 CUR MEDS NOT DOCUMENT: HCPCS | Performed by: FAMILY MEDICINE

## 2018-08-02 PROCEDURE — 1036F TOBACCO NON-USER: CPT | Performed by: FAMILY MEDICINE

## 2018-08-02 PROCEDURE — G8417 CALC BMI ABV UP PARAM F/U: HCPCS | Performed by: FAMILY MEDICINE

## 2018-08-02 PROCEDURE — 1123F ACP DISCUSS/DSCN MKR DOCD: CPT | Performed by: FAMILY MEDICINE

## 2018-08-02 PROCEDURE — 1101F PT FALLS ASSESS-DOCD LE1/YR: CPT | Performed by: FAMILY MEDICINE

## 2018-08-02 PROCEDURE — 4040F PNEUMOC VAC/ADMIN/RCVD: CPT | Performed by: FAMILY MEDICINE

## 2018-08-02 PROCEDURE — 99212 OFFICE O/P EST SF 10 MIN: CPT | Performed by: FAMILY MEDICINE

## 2018-08-02 PROCEDURE — 99213 OFFICE O/P EST LOW 20 MIN: CPT | Performed by: FAMILY MEDICINE

## 2018-08-02 PROCEDURE — 1090F PRES/ABSN URINE INCON ASSESS: CPT | Performed by: FAMILY MEDICINE

## 2018-08-02 RX ORDER — GABAPENTIN 100 MG/1
100 CAPSULE ORAL NIGHTLY
Qty: 90 CAPSULE | Refills: 3 | Status: SHIPPED | OUTPATIENT
Start: 2018-08-02 | End: 2019-08-01 | Stop reason: SDUPTHER

## 2018-08-02 NOTE — PROGRESS NOTES
pain, edema, joint swelling or redness  NEUROLOGICAL:  light headed, dizziness, loss of consciousness, weakness, change                                   in memory, seizures, tremors    Social history:  Social History     Social History    Marital status: Single     Spouse name: N/A    Number of children: N/A    Years of education: N/A     Social History Main Topics    Smoking status: Former Smoker     Packs/day: 1.00     Years: 15.00    Smokeless tobacco: Never Used    Alcohol use No    Drug use: No    Sexual activity: No     Other Topics Concern    Not on file     Social History Narrative    No narrative on file         Family history:  No family history on file. PE:    Vitals:   Vitals:    08/02/18 1512   BP: 130/64   Weight: 131 lb (59.4 kg)       Gen: WD, WN, NAD, awake, alert, able to voice their needs/thoughts, alert, jovial, pleasant  HEENT: normocephalic, atraumatic, sclera nonicteric, PERRLA, EOMI, MMM, good speech tone and quality Intraoral exam with signs of oral thrush. Neck: no LAD, no JVD, supple, no masses, normal speech, trachea midline,   Lungs: bilaterally clear to auscultation, good aeration, symmetric  Heart: regular rate and rhythm, no murmurs/rubs/gallops/ectopy appreciated, PMI WNL  Abd.: non-distended, soft, nontender, non-distended, normal bowel sounds  Ext.: no clubbing, cyanosis or edema, no joint tenderness  Skin: warm, adequate hydration without acute lesions but hypopigmented areas to her lower extremities without sign of infection  Neuro: awake, alert, oriented x 3, conversive,       Impression:  As per Dr Nick Payne on 12/08/15:  79 y/o -American female with 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 CXR PA/Lateral was negative for metastatic disease. Patient's age group precluded her from having invasive aggressive surgical procedure at that time.  Oncology decided to challenge her cancer first with concurrent experiences dizziness upon initially standing but this self corrects itself. We discussed orthostatic hypotension. When asked what we can help her with she answered \"get my teeth\". Patient was introduced to the Palliative Medicine department, social work visited and we will continue to monitor her. Patient will be hospice appropriate in the future and we will discuss this with her but she desires to seek aggressive treatment with the above which includes testing, procedures when offered at this time. We will address on subsequent visits. We will see her back in 1 month or sooner if she needs us monitoring her weight. 01/12/16:  Patient evaluated with family member in the room today stating that she is feeling good but has an appointment with her dentist in April. Patient complaining of a stuffy nose over the past 1 week. Patient has gained 2 pounds since her last visit here and states that she is trying to eat a lot of soup, ensure and anything that she can but can't wait to get her teeth so she can eat \"real food\". Options were discussed and I advised the patient to run a humidifier in her bedroom at night as her oral mucosa and nasal mucosa are dry without signs of infection. Family member stated that they would  one for her today. They will keep her appointment with her dentist in April and desired to come back and see us monthly. 02/09/16:  Patient presents today for evaluation of her weight, teeth stating that she was seen in the dental clinic and was given Peridex which irritated her mouth making it sore and red. Patient stopped on her own. Patient continues pushing nutrition and has gained 1.1 pound since her last visit. Patient denies any other symptoms except for a left thumbnail that is growing awkwardly. Patient states there was a blister on the base which is now gone.   An evaluation I advised that she should attempt to not snag the nail as it is connected by approximately 60% without sign of infection. Patient voices understanding. Patient has an appointment in April for evaluation and possibly fitting of dentures. Patient is excited about this. We will see her back by her request in 2 months or sooner if she needs us. 04/14/16:  [place] Symptom Assessment Scale                                             Date:   Pain  [] 0  None  [x] 1  [] 2  [] 3  [] 4  [] 5  [] 6  [] 7  [] 8  [] 9  [] 10  Worst   Tiredness  [] 0  None  [] 1  [] 2  [x] 3  [] 4  [] 5  [] 6  [x] 7  [] 8  [] 9  [] 10  Worst   Nausea  [x] 0  None  [] 1  [] 2  [] 3  [] 4  [] 5  [] 6  [] 7  [] 8  [] 9  [] 10  Worst   Depression  [x] 0  None  [] 1  [] 2  [] 3  [] 4  [] 5  [] 6  [] 7  [] 8  [] 9  [] 10  Worst   Anxiety  [] 0  None  [] 1  [] 2  [] 3  [] 4  [] 5  [] 6  [] 7  [] 8  [] 9  [] 10  Worst   Drowsiness  [] 0  None  [] 1  [] 2  [] 3  [] 4  [] 5  [] 6  [] 7  [] 8  [x] 9  [] 10  Worst   Appetite  [] 0  None  [] 1  [] 2  [] 3  [] 4  [] 5  [] 6  [x] 7  [] 8  [] 9  [] 10  Worst   Wellbeing  [] 0  None  [x] 1  [] 2  [] 3  [] 4  [] 5  [] 6  [] 7  [] 8  [] 9  [] 10  Worst   Shortness of Breath  [] 0  None  [] 1  [] 2  [x] 3  [] 4  [] 5  [] 6  [] 7  [] 8  [] 9  [] 10  Worst   Constipation  [] 0  None  [] 1  [] 2  [x] 3  [] 4  [] 5  [] 6  [] 7  [] 8  [] 9  [] 10  Worst   Completed By:  [x]  Patient Report  []  Caregiver/Family  []  Nurse/Staff  Patient is currently a:  [x]   Palliative Medicine Patient  []   Hospice Patient  Medical records reviewed and patient presents ambulatory in no acute distress looking well at her baseline. As per Dr. Niles Rivera D.O.  Ms. Amanda Ku. Evaluation on 2/26/16:     History of oropharyngeal, retromolar trigone squamous cell carcinoma     Patient is to have denture fittings  Continue oral hydration  Continue oral feeding as much as possible  Follow-up in 3 months for routine screening or sooner with any bleeding pain swelling or difficulty swallowing  Patient looks well but still waiting for her dentures stating that she has an appointment at the end of this month and hopefully we'll have him then. Patient use the Peridex prescribed by the dentist and stated she had to stop that secondary to oral ulcers. Patient's weight not significantly changed and they're working with her diet as much as possible. The patient does complain of nighttime coughing which is nonproductive but does have sputum and phlegm in the morning when she wakes up. Patient denies heartburn or any other symptoms and states that the coughing has gone on \"for several months\". Options were discussed and she desires to come back to see us periodically for us to monitor and help with symptoms. Today I did prescribe omeprazole 20 mg daily with refills see if this may cut down on her nighttime coughing and she will let us know if she has further problems otherwise, back and see us in approximately 2 months.   06/23/16:  [place] Symptom Assessment Scale                                             Date:   Pain  [] 0  None  [] 1  [] 2  [] 3  [x] 4  [] 5  [] 6  [] 7  [] 8  [] 9  [] 10  Worst   Tiredness  [] 0  None  [] 1  [] 2  [] 3  [] 4  [x] 5  [] 6  [] 7  [] 8  [] 9  [] 10  Worst   Nausea  [] 0  None  [] 1  [] 2  [] 3  [x] 4  [] 5  [] 6  [] 7  [] 8  [] 9  [] 10  Worst   Depression  [x] 0  None  [] 1  [] 2  [] 3  [] 4  [] 5  [] 6  [] 7  [] 8  [] 9  [] 10  Worst   Anxiety  [x] 0  None  [] 1  [] 2  [] 3  [] 4  [] 5  [] 6  [] 7  [] 8  [] 9  [] 10  Worst   Drowsiness  [] 0  None  [] 1  [] 2  [] 3  [] 4  [] 5  [] 6  [x] 7  [] 8  [] 9  [] 10  Worst   Appetite  [] 0  None  [] 1  [] 2  [] 3  [] 4  [x] 5  [] 6  [] 7  [] 8  [] 9  [] 10  Worst   Wellbeing  [] 0  None  [x] 1  [] 2  [] 3  [] 4  [] 5  [] 6  [] 7  [] 8  [] 9  [] 10  Worst   Shortness of Breath  [x] 0  None  [] 1  [] 2  [] 3  [] 4  [] 5  [] 6  [] 7  [] 8  [] 9  [] 10  Worst   Constipation  [] 0  None  [] 1  [x] 2  [] 3  [] 4  [] 5  [] 6  [] 7  [] 8  [] 9  [] 10  Worst   Completed difficulty with swallowing certain textures of foods and requires food to be pureed. We discussed options for increasing her calories and she is willing to continue with Boost.  She was unable to get her dentures secondary to limited bone availability. She has a fair appetite and blames this on not being able to eat what she likes. Her weight is stable. She has noticed continued mouth dryness and some recent soreness. She uses the biotene rinse and mouth moisturizer. She has evidence of thrush coating her tongue. Prescribed nystatin swish and spit X7 days. Instructed the patient and caregiver that if the thrush is not better in 7 days she may repeat for one more week. Instructed to call if not resolved after that. Prescription provided for Nystatin 500,000 units 5ml swish and spit QID X7 days with 1 refill. She states that sleep is fair. She blames this on daytime napping. Reviewed that if she naps less during the day she may sleep better at night. She states that she has an occasional cough, however, her night time coughing is much improved. Will continue the omeprazole 20mg dly. Prescription provided for omeprazole 20mg dly # 30 with 2 refills. We will see her back in 3 months or sooner if needed. 11/10/16:  Medical records reviewed and patient states couple days ago that she noticed significant right sided facial pain pointing to her right mandibular area preauricular. Patient denies any traumas but states it's difficult to eat because of this. Caregiver states that she was slumped over in her bed yesterday morning as she was trying to get up and she knew something was going on at that time. Patient did not want to go to the emergency department but came to see us this morning. Patient obviously uncomfortable, alert with no sign of confusion or sedation but not at her baseline in discomfort.   Options were discussed and I felt it was best to obtain blood work, x-rays of the area looking for an occult fracture, infection by presenting to the emergency department where they could also help her with her immediate pain needs. Patient took an aspirin yesterday and usually is not on anything for discomfort. They voiced desire to do so and we will communicate with the emergency department as well. 12/08/16:  Medical records were reviewed and as per her dentist's assessment on 12/02/16:  Reyna Roberts is a 80y.o. 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 Patient has been treated medically, responded well. Patient denies any pain, difficulty swallowing, she has occasional twinges of soreness in the parotid area. Patient did have an acute parotitis secondary to radiation changes but has responded well to medical therapy. Patient presents ambulatory in no acute distress but much more comfortable than I had seen her last time. Patient still irritated that she cannot eat chicken but has a humor about it. Patient takes Percocet 5325 milligram tablets in the morning and evening with good results of her pain. Patient also on Mobic 7.5 mg daily and her renal function studies unchanged from September 2015 while on the medication. Patient denies any other new symptoms. Options were discussed and we are continuing her Percocet as above prescribing 60 tablets today. Patient also given a prescription for Neurontin 100 mg capsules 1 p.o. q.h.s. as per Epic that they will initiate if her pain does not continue to improve. We will follow-up with them in 4 weeks or sooner if they need us. 01/26/17:  Medical records reviewed and no documented patient physician visits in [facility] since my last visit with her. Patient presents ambulatory in no acute distress at her baseline without sign of sedation and/or confusion.   Daughter did not feel comfortable giving her the Neurontin after one dose even though the patient had no problems or sedation the next morning secondary to reading the list of side effects. Infrequently patient uses Percocet with good relief of her pain and patient today complains of tenderness over the right TMJ. Patient denies any falls, traumas, locking or audible noises to this area. Daughter has noticed that she is \"chewing\" when she is not eating as a habit that she stops on her own. Options were discussed and we're re-prescribe the Percocet unchanged time 90 tablets as well as Neurontin 100 mg q.h.s. as per Epic. I advised the patient that we can escalate this in the future if she continues with pain and they voiced understanding. They are to call us if they have any difficulties otherwise we will see them in 3 months. 04/20/17:  OARRS report reviewed today without prescribing red flags. Medical records reviewed and as per discharge summary from 6529/17:  80year old female with history of CKD II, and asthma also cancer, presented with worsening generalized weakness with imbalance, she also developed cough and acute onset of shortness of breath, we admitted the patient and found her to be positive for influenza B  1. Acute hypoxic respiratory failure, due to influenza, off O2 at rest, continue on O2 on ambulation. 2. Influenza B, continue on tamiflu, renally adjusted dose. Finished the course today. 3. CKD III, cr is stable at 1.5  4. Lactic acidosis, due to dehydration, resolved with hydration  PT/OT melissaal recommended SNF, Patient and daughter were in agreement, I will discharge the patient to SNF, and she is to follow up with her doctors there, she was stable upon discharge. Patient presents to the exam room accompanied by her daughter in law and patient looks comfortable in no acute distress at her baseline, alert, communicative able to voice her needs.   Patient and daughter-in-law to review her hospital admission, rehab and now patient is feels that she does not sleep that full-time. Patient has been doing well without complaints other than fatigue. Options were discussed and we are restarting her Neurontin 100 mg q.h.s. that she has been off of over the past month and hopefully this will help her get back into more of a rhythm as far as her sleep goes and help with any neuropathic pain from her previous treatments. We will see her back in 6 months or sooner if she needs us. 08/02/18:  Medical records reviewed and patient presents looking slightly more spry than she has in the past last year able to voice her needs ambulating well with the help of a cane. Patient's primary complaint is not being able to sleep as well. Upon further questioning patient stopped her gabapentin 100 mg q.h.s. for some unknown reason. Caregiver states that maybe it was because they were giving antibiotics at that time and she just did not restart it. Patient has gained 3 pounds and otherwise doing well. Options were discussed and we are restarting gabapentin 100 mg q.h.s. as per Epic and receiving her back 6 months from now. They know to call if she has any problems or concerns. Controlled Substances Monitoring: Attestation: The Prescription Monitoring Report for this patient was reviewed today. (Jadyn Lee MD)  Documentation: Possible medication side effects, risk of tolerance/dependence & alternative treatments discussed., No signs of potential drug abuse or diversion identified.  (Jadyn Lee MD)  Time in minutes:    [] 15   [] 30   [x] 45   [] 60   [] 75   [] 90  Chart review/documentation:  [x] 15   [] 30   [] 45   [] 60   [] 75   [] 90  Assessment:     [x] 15   [] 30   [] 45   [] 60   [] 75   [] 90  Conversation patient/family/staff: [x] 15   [] 30   [] 45   [] 60   [] 75   [] Reilly Coreas MD  8/2/2018

## 2018-09-10 ENCOUNTER — HOSPITAL ENCOUNTER (EMERGENCY)
Age: 83
Discharge: HOME | End: 2018-09-10
Attending: EMERGENCY MEDICINE
Payer: COMMERCIAL

## 2018-09-10 ENCOUNTER — APPOINTMENT (OUTPATIENT)
Dept: CT IMAGING | Age: 83
End: 2018-09-10
Payer: COMMERCIAL

## 2018-09-10 ENCOUNTER — APPOINTMENT (OUTPATIENT)
Dept: GENERAL RADIOLOGY | Age: 83
End: 2018-09-10
Payer: COMMERCIAL

## 2018-09-10 VITALS
RESPIRATION RATE: 18 BRPM | SYSTOLIC BLOOD PRESSURE: 136 MMHG | HEIGHT: 55 IN | DIASTOLIC BLOOD PRESSURE: 70 MMHG | HEART RATE: 70 BPM | OXYGEN SATURATION: 96 % | BODY MASS INDEX: 30.09 KG/M2 | TEMPERATURE: 97.6 F | WEIGHT: 130 LBS

## 2018-09-10 DIAGNOSIS — R53.83 FATIGUE, UNSPECIFIED TYPE: ICD-10-CM

## 2018-09-10 DIAGNOSIS — R53.1 GENERAL WEAKNESS: Primary | ICD-10-CM

## 2018-09-10 LAB
ALBUMIN SERPL-MCNC: 3.2 G/DL (ref 3.5–5.2)
ALP BLD-CCNC: 235 U/L (ref 35–104)
ALT SERPL-CCNC: 22 U/L (ref 0–32)
ANION GAP SERPL CALCULATED.3IONS-SCNC: 10 MMOL/L (ref 7–16)
AST SERPL-CCNC: 40 U/L (ref 0–31)
BASOPHILS ABSOLUTE: 0.03 E9/L (ref 0–0.2)
BASOPHILS RELATIVE PERCENT: 0.4 % (ref 0–2)
BILIRUB SERPL-MCNC: 0.3 MG/DL (ref 0–1.2)
BILIRUBIN URINE: NEGATIVE
BLOOD, URINE: NEGATIVE
BUN BLDV-MCNC: 18 MG/DL (ref 8–23)
CALCIUM SERPL-MCNC: 9.3 MG/DL (ref 8.6–10.2)
CHLORIDE BLD-SCNC: 103 MMOL/L (ref 98–107)
CLARITY: CLEAR
CO2: 30 MMOL/L (ref 22–29)
COLOR: YELLOW
CREAT SERPL-MCNC: 1.5 MG/DL (ref 0.5–1)
EKG ATRIAL RATE: 69 BPM
EKG P AXIS: 57 DEGREES
EKG Q-T INTERVAL: 416 MS
EKG QRS DURATION: 92 MS
EKG QTC CALCULATION (BAZETT): 445 MS
EKG R AXIS: 14 DEGREES
EKG T AXIS: 20 DEGREES
EKG VENTRICULAR RATE: 69 BPM
EOSINOPHILS ABSOLUTE: 0.1 E9/L (ref 0.05–0.5)
EOSINOPHILS RELATIVE PERCENT: 1.3 % (ref 0–6)
GFR AFRICAN AMERICAN: 39
GFR NON-AFRICAN AMERICAN: 39 ML/MIN/1.73
GLUCOSE BLD-MCNC: 76 MG/DL (ref 74–109)
GLUCOSE URINE: NEGATIVE MG/DL
HCT VFR BLD CALC: 35.2 % (ref 34–48)
HEMOGLOBIN: 11 G/DL (ref 11.5–15.5)
IMMATURE GRANULOCYTES #: 0.02 E9/L
IMMATURE GRANULOCYTES %: 0.3 % (ref 0–5)
KETONES, URINE: NEGATIVE MG/DL
LACTIC ACID: 1.4 MMOL/L (ref 0.5–2.2)
LEUKOCYTE ESTERASE, URINE: NEGATIVE
LYMPHOCYTES ABSOLUTE: 2.63 E9/L (ref 1.5–4)
LYMPHOCYTES RELATIVE PERCENT: 35 % (ref 20–42)
MCH RBC QN AUTO: 31.2 PG (ref 26–35)
MCHC RBC AUTO-ENTMCNC: 31.3 % (ref 32–34.5)
MCV RBC AUTO: 99.7 FL (ref 80–99.9)
METER GLUCOSE: 95 MG/DL (ref 70–110)
MONOCYTES ABSOLUTE: 0.51 E9/L (ref 0.1–0.95)
MONOCYTES RELATIVE PERCENT: 6.8 % (ref 2–12)
NEUTROPHILS ABSOLUTE: 4.22 E9/L (ref 1.8–7.3)
NEUTROPHILS RELATIVE PERCENT: 56.2 % (ref 43–80)
NITRITE, URINE: NEGATIVE
PDW BLD-RTO: 13.1 FL (ref 11.5–15)
PH UA: 6 (ref 5–9)
PLATELET # BLD: 227 E9/L (ref 130–450)
PMV BLD AUTO: 10.5 FL (ref 7–12)
POTASSIUM SERPL-SCNC: 4.3 MMOL/L (ref 3.5–5)
PROTEIN UA: NEGATIVE MG/DL
RBC # BLD: 3.53 E12/L (ref 3.5–5.5)
SODIUM BLD-SCNC: 143 MMOL/L (ref 132–146)
SPECIFIC GRAVITY UA: 1.01 (ref 1–1.03)
TOTAL PROTEIN: 7.9 G/DL (ref 6.4–8.3)
TSH SERPL DL<=0.05 MIU/L-ACNC: 2.27 UIU/ML (ref 0.27–4.2)
UROBILINOGEN, URINE: 0.2 E.U./DL
WBC # BLD: 7.5 E9/L (ref 4.5–11.5)

## 2018-09-10 PROCEDURE — 71046 X-RAY EXAM CHEST 2 VIEWS: CPT

## 2018-09-10 PROCEDURE — 93005 ELECTROCARDIOGRAM TRACING: CPT | Performed by: PHYSICIAN ASSISTANT

## 2018-09-10 PROCEDURE — 87088 URINE BACTERIA CULTURE: CPT

## 2018-09-10 PROCEDURE — 85025 COMPLETE CBC W/AUTO DIFF WBC: CPT

## 2018-09-10 PROCEDURE — 82962 GLUCOSE BLOOD TEST: CPT

## 2018-09-10 PROCEDURE — 80053 COMPREHEN METABOLIC PANEL: CPT

## 2018-09-10 PROCEDURE — 83605 ASSAY OF LACTIC ACID: CPT

## 2018-09-10 PROCEDURE — 81003 URINALYSIS AUTO W/O SCOPE: CPT

## 2018-09-10 PROCEDURE — 84443 ASSAY THYROID STIM HORMONE: CPT

## 2018-09-10 PROCEDURE — 36415 COLL VENOUS BLD VENIPUNCTURE: CPT

## 2018-09-10 PROCEDURE — 70450 CT HEAD/BRAIN W/O DYE: CPT

## 2018-09-10 PROCEDURE — 2580000003 HC RX 258: Performed by: PHYSICIAN ASSISTANT

## 2018-09-10 PROCEDURE — 99285 EMERGENCY DEPT VISIT HI MDM: CPT

## 2018-09-10 RX ORDER — 0.9 % SODIUM CHLORIDE 0.9 %
1000 INTRAVENOUS SOLUTION INTRAVENOUS ONCE
Status: COMPLETED | OUTPATIENT
Start: 2018-09-10 | End: 2018-09-10

## 2018-09-10 RX ADMIN — SODIUM CHLORIDE 1000 ML: 9 INJECTION, SOLUTION INTRAVENOUS at 16:41

## 2018-09-10 NOTE — ED NOTES
Have been  Unable to obtain an IV site or get bloodwork. Attempted multiple times and other staff has tried. Even tried art stick with no success. Bela was notified.      Pam Aguirre RN  09/10/18 3937

## 2018-09-10 NOTE — ED PROVIDER NOTES
([facility] and Influenza B. Past Surgical History:  has a past surgical history that includes Cataract removal.    Social History:  reports that she has quit smoking. She has a 15.00 pack-year smoking history. She has never used smokeless tobacco. She reports that she does not drink alcohol or use drugs. Family History: family history is not on file. The patients home medications have been reviewed. Allergies: Chlorhexidine gluconate and Penicillins    -------------------------------------------------- RESULTS -------------------------------------------------  All laboratory and radiology results have been personally reviewed by myself   LABS:  Results for orders placed or performed during the hospital encounter of 09/10/18   Urinalysis   Result Value Ref Range    Color, UA Yellow Straw/Yellow    Clarity, UA Clear Clear    Glucose, Ur Negative Negative mg/dL    Bilirubin Urine Negative Negative    Ketones, Urine Negative Negative mg/dL    Specific Gravity, UA 1.010 1.005 - 1.030    Blood, Urine Negative Negative    pH, UA 6.0 5.0 - 9.0    Protein, UA Negative Negative mg/dL    Urobilinogen, Urine 0.2 <2.0 E.U./dL    Nitrite, Urine Negative Negative    Leukocyte Esterase, Urine Negative Negative   POCT Glucose   Result Value Ref Range    Meter Glucose 95 70 - 110 mg/dL       RADIOLOGY:  Interpreted by Radiologist.  CT Head WO Contrast   Final Result      Extensive white matter disease with no hemorrhage or mass effect. XR CHEST STANDARD (2 VW)   Final Result   1. No active cardiopulmonary disease.           ------------------------- NURSING NOTES AND VITALS REVIEWED ---------------------------   The nursing notes within the ED encounter and vital signs as below have been reviewed.    BP (!) 139/40   Pulse 72   Temp 97.8 °F (36.6 °C) (Oral)   Resp 20   Ht 4' 5\" (1.346 m)   Wt 130 lb (59 kg)   SpO2 94%   BMI 32.54 kg/m²   Oxygen Saturation Interpretation: agree with the past medical, family and social history unless otherwise noted. History: Family states patient seemed less active and talkative this morning than her usual self. There is no specific complaints of recent. She's been eating well. My findings: Jonna Brooks is a [place] y.o. female whom is in no distress. Physical exam reveals patient is pleasant and conversational. Oral mucosa somewhat dry. Skin turgor is poor. She is alert. Heart is regular, lungs are clear. No significant peripheral edema. Good peripheral strength. NIH equals 0. My plan: Symptomatic and supportive care. Labs. UA. [TG]      ED Course User Index  [TG] Shy Murphyr, DO       --------------------------------------------- PAST HISTORY ---------------------------------------------  Past Medical History:  has a past medical history of Asthma; Cancer ([facility] [facility] and Influenza B. Past Surgical History:  has a past surgical history that includes Cataract removal.    Social History:  reports that she has quit smoking. She has a 15.00 pack-year smoking history. She has never used smokeless tobacco. She reports that she does not drink alcohol or use drugs. Family History: family history is not on file. The patients home medications have been reviewed.     Allergies: Chlorhexidine gluconate and Penicillins    -------------------------------------------------- RESULTS -------------------------------------------------  Labs:  Results for orders placed or performed during the hospital encounter of 09/10/18   CBC Auto Differential   Result Value Ref Range    WBC 7.5 4.5 - 11.5 E9/L    RBC 3.53 3.50 - 5.50 E12/L    Hemoglobin 11.0 (L) 11.5 - 15.5 g/dL    Hematocrit 35.2 34.0 - 48.0 %    MCV 99.7 80.0 - 99.9 fL    MCH 31.2 26.0 - 35.0 pg    MCHC 31.3 (L) 32.0 - 34.5 %    RDW 13.1 11.5 - 15.0 fL    Platelets 187 191 - 576 E9/L    MPV 10.5 7.0 - 12.0 fL    Neutrophils % 56.2 43.0 - 80.0 %    Immature Granulocytes % 0.3 0.0 - 5.0 %

## 2018-09-12 LAB — URINE CULTURE, ROUTINE: NORMAL

## 2018-10-12 ENCOUNTER — HOSPITAL ENCOUNTER (OUTPATIENT)
Age: 83
Discharge: HOME | End: 2018-10-12
Payer: COMMERCIAL

## 2018-10-12 LAB
ALBUMIN SERPL-MCNC: 3.4 G/DL (ref 3.5–5.2)
ALP BLD-CCNC: 215 U/L (ref 35–104)
ALT SERPL-CCNC: 19 U/L (ref 0–32)
ANION GAP SERPL CALCULATED.3IONS-SCNC: 13 MMOL/L (ref 7–16)
AST SERPL-CCNC: 39 U/L (ref 0–31)
BILIRUB SERPL-MCNC: 0.3 MG/DL (ref 0–1.2)
BUN BLDV-MCNC: 29 MG/DL (ref 8–23)
CALCIUM SERPL-MCNC: 9.8 MG/DL (ref 8.6–10.2)
CHLORIDE BLD-SCNC: 101 MMOL/L (ref 98–107)
CO2: 26 MMOL/L (ref 22–29)
CREAT SERPL-MCNC: 1.9 MG/DL (ref 0.5–1)
GFR AFRICAN AMERICAN: 29
GFR NON-AFRICAN AMERICAN: 29 ML/MIN/1.73
GLUCOSE BLD-MCNC: 101 MG/DL (ref 74–109)
HCT VFR BLD CALC: 40.4 % (ref 34–48)
HEMOGLOBIN: 12.6 G/DL (ref 11.5–15.5)
MCH RBC QN AUTO: 31.9 PG (ref 26–35)
MCHC RBC AUTO-ENTMCNC: 31.2 % (ref 32–34.5)
MCV RBC AUTO: 102.3 FL (ref 80–99.9)
PDW BLD-RTO: 12.6 FL (ref 11.5–15)
PLATELET # BLD: 201 E9/L (ref 130–450)
PMV BLD AUTO: 9.6 FL (ref 7–12)
POTASSIUM SERPL-SCNC: 4.3 MMOL/L (ref 3.5–5)
RBC # BLD: 3.95 E12/L (ref 3.5–5.5)
SEDIMENTATION RATE, ERYTHROCYTE: 94 MM/HR (ref 0–20)
SODIUM BLD-SCNC: 140 MMOL/L (ref 132–146)
TOTAL PROTEIN: 8.6 G/DL (ref 6.4–8.3)
WBC # BLD: 5.6 E9/L (ref 4.5–11.5)

## 2018-10-12 PROCEDURE — 80053 COMPREHEN METABOLIC PANEL: CPT

## 2018-10-12 PROCEDURE — 85651 RBC SED RATE NONAUTOMATED: CPT

## 2018-10-12 PROCEDURE — 85027 COMPLETE CBC AUTOMATED: CPT

## 2018-10-12 PROCEDURE — 36415 COLL VENOUS BLD VENIPUNCTURE: CPT

## 2018-10-17 ENCOUNTER — HOSPITAL ENCOUNTER (EMERGENCY)
Age: 83
Discharge: HOME | End: 2018-10-17
Attending: EMERGENCY MEDICINE
Payer: COMMERCIAL

## 2018-10-17 VITALS
RESPIRATION RATE: 18 BRPM | DIASTOLIC BLOOD PRESSURE: 69 MMHG | TEMPERATURE: 98.1 F | SYSTOLIC BLOOD PRESSURE: 154 MMHG | HEART RATE: 52 BPM

## 2018-10-17 DIAGNOSIS — L30.9 DERMATITIS: Primary | ICD-10-CM

## 2018-10-17 PROCEDURE — 6370000000 HC RX 637 (ALT 250 FOR IP): Performed by: EMERGENCY MEDICINE

## 2018-10-17 PROCEDURE — 99282 EMERGENCY DEPT VISIT SF MDM: CPT

## 2018-10-17 RX ORDER — ZINC OXIDE
OINTMENT (GRAM) TOPICAL
Qty: 1 TUBE | Refills: 0 | Status: ON HOLD | OUTPATIENT
Start: 2018-10-17 | End: 2020-01-31

## 2018-10-17 RX ORDER — ZINC OXIDE
OINTMENT (GRAM) TOPICAL 4 TIMES DAILY PRN
Status: DISCONTINUED | OUTPATIENT
Start: 2018-10-17 | End: 2018-10-17 | Stop reason: HOSPADM

## 2018-10-17 RX ADMIN — Medication: at 14:32

## 2018-10-17 ASSESSMENT — ENCOUNTER SYMPTOMS
SORE THROAT: 0
BACK PAIN: 0
COUGH: 0
WHEEZING: 0
NAUSEA: 0
SHORTNESS OF BREATH: 0
SINUS PAIN: 0
CONSTIPATION: 0
PHOTOPHOBIA: 0
SINUS PRESSURE: 0
RHINORRHEA: 0
ABDOMINAL PAIN: 0
DIARRHEA: 0
VOMITING: 0

## 2018-10-17 NOTE — ED PROVIDER NOTES
Patient is a Hillary  [age] y/o female who presents with a chief complaint of vaginal itching. Patient states that for the past month she has been dealing with a candidal infection of her perineum. She has seen infectious disease for this and was prescribed nystatin powder, and has been on Diflucan. Patient's candidal rash is not improving like she would like it to. Her family member is at bedside and states that it is causing her pain and she wants relief for her. Patient denies any fevers, chills, dysuria, or hematuria. Her family member has changed her from depends to cotton underwear, because she thought it was a reaction to the moist depends. The history is provided by the patient and a relative. No  was used. Review of Systems   Constitutional: Negative for chills, fatigue and fever. HENT: Negative for congestion, rhinorrhea, sinus pain, sinus pressure, sneezing and sore throat. Eyes: Negative for photophobia and visual disturbance. Respiratory: Negative for cough, shortness of breath and wheezing. Cardiovascular: Negative for chest pain and palpitations. Gastrointestinal: Negative for abdominal pain, constipation, diarrhea, nausea and vomiting. Genitourinary: Negative for dysuria, frequency and hematuria. Musculoskeletal: Negative for back pain, neck pain and neck stiffness. Skin: Positive for rash. Negative for wound. Neurological: Negative for dizziness, light-headedness and headaches. Psychiatric/Behavioral: Negative for agitation, behavioral problems and confusion. Physical Exam   Constitutional: She is oriented to person, place, and time. She appears well-developed and well-nourished. No distress. HENT:   Head: Normocephalic and atraumatic. Mouth/Throat: Oropharynx is clear and moist. No oropharyngeal exudate. Eyes: Pupils are equal, round, and reactive to light. Conjunctivae are normal. Right eye exhibits no discharge.  Left eye exhibits no discharge. No scleral icterus. Neck: Normal range of motion. Neck supple. Cardiovascular: Normal rate, regular rhythm and normal heart sounds. Exam reveals no gallop. No murmur heard. Pulmonary/Chest: Effort normal and breath sounds normal. No respiratory distress. She has no wheezes. Abdominal: Soft. Bowel sounds are normal. She exhibits no distension. There is no tenderness. Genitourinary:   Genitourinary Comments: Candidal rash of the perineum, extending to bilateral groin. No open wounds. Musculoskeletal: Normal range of motion. She exhibits no edema or tenderness. Lymphadenopathy:     She has no cervical adenopathy. Neurological: She is alert and oriented to person, place, and time. No cranial nerve deficit. Coordination normal.   Skin: Skin is warm and dry. No rash noted. She is not diaphoretic. No erythema. Psychiatric: She has a normal mood and affect. Her behavior is normal.       Procedures    [facility]            --------------------------------------------- PAST HISTORY ---------------------------------------------  Past Medical History:  has a past medical history of Asthma; Cancer ([facility] [address] and Influenza B. Past Surgical History:  has a past surgical history that includes Cataract removal.    Social History:  reports that she has quit smoking. She has a 15.00 pack-year smoking history. She has never used smokeless tobacco. She reports that she does not drink alcohol or use drugs. Family History: family history is not on file. The patients home medications have been reviewed. Allergies: Chlorhexidine gluconate and Penicillins    -------------------------------------------------- RESULTS -------------------------------------------------  Labs:  No results found for this visit on 10/17/18.     Radiology:  No orders to display       ------------------------- NURSING NOTES AND VITALS REVIEWED ---------------------------  Date / Time Roomed:  10/17/2018  1:15 PM  ED Bed Assignment:

## 2018-12-10 ENCOUNTER — OFFICE VISIT (OUTPATIENT)
Dept: ENT CLINIC | Age: 83
End: 2018-12-10
Payer: COMMERCIAL

## 2018-12-10 VITALS — HEIGHT: 59 IN | BODY MASS INDEX: 24.39 KG/M2 | WEIGHT: 121 LBS

## 2018-12-10 DIAGNOSIS — L29.9 EAR ITCHING: ICD-10-CM

## 2018-12-10 DIAGNOSIS — C06.9 SQUAMOUS CELL CARCINOMA OF ORAL CAVITY (HCC): Primary | ICD-10-CM

## 2018-12-10 PROCEDURE — 1036F TOBACCO NON-USER: CPT | Performed by: OTOLARYNGOLOGY

## 2018-12-10 PROCEDURE — G8427 DOCREV CUR MEDS BY ELIG CLIN: HCPCS | Performed by: OTOLARYNGOLOGY

## 2018-12-10 PROCEDURE — 1101F PT FALLS ASSESS-DOCD LE1/YR: CPT | Performed by: OTOLARYNGOLOGY

## 2018-12-10 PROCEDURE — G8420 CALC BMI NORM PARAMETERS: HCPCS | Performed by: OTOLARYNGOLOGY

## 2018-12-10 PROCEDURE — 4040F PNEUMOC VAC/ADMIN/RCVD: CPT | Performed by: OTOLARYNGOLOGY

## 2018-12-10 PROCEDURE — 99213 OFFICE O/P EST LOW 20 MIN: CPT | Performed by: OTOLARYNGOLOGY

## 2018-12-10 PROCEDURE — 1090F PRES/ABSN URINE INCON ASSESS: CPT | Performed by: OTOLARYNGOLOGY

## 2018-12-10 PROCEDURE — G8484 FLU IMMUNIZE NO ADMIN: HCPCS | Performed by: OTOLARYNGOLOGY

## 2018-12-10 PROCEDURE — 1123F ACP DISCUSS/DSCN MKR DOCD: CPT | Performed by: OTOLARYNGOLOGY

## 2018-12-10 ASSESSMENT — ENCOUNTER SYMPTOMS
HEARTBURN: 0
VOMITING: 0
COUGH: 0
SHORTNESS OF BREATH: 0
BLURRED VISION: 0
DOUBLE VISION: 0

## 2018-12-10 NOTE — PROGRESS NOTES
[facility] Otolaryngology    Patient Name:  Ricardo Cabrera  :  1918     CHIEF C/O:    Chief Complaint   Patient presents with    Follow-up     6 mo oral cancer check; no problems per patient       HISTORY OBTAINED FROM:  patient    HISTORY OF PRESENT ILLNESS:       Heena Fam is a 8 y.o. 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 Patient is tolerating by mouth pureed diet, no episodes of bleeding denies difficulty swallowing, or changes in voice. Patient has not noted any swelling of the neck or submandibular region. No pain or discharge of the ears, mild itching bilaterally. 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 Past Medical History:   Diagnosis Date    Asthma     Cancer ([facility] [address]     throat    Influenza B 3/25/2017     Past Surgical History:   Procedure Laterality Date    CATARACT REMOVAL         Current Outpatient Prescriptions:     zinc oxide (DESITIN) 40 % ointment, Apply topically as needed. , Disp: 1 Tube, Rfl: 0    nystatin (MYCOSTATIN) POWD powder, Apply nystatin powder to groin area BID and as needed, Disp: 1 each, Rfl: 3    miconazole (ZEASORB-AF) 2 % powder, Apply topically 2 times daily. , Disp: 45 g, Rfl: 2    gabapentin (NEURONTIN) 100 MG capsule, Take 1 capsule by mouth nightly for 360 days. ., Disp: 90 capsule, Rfl: 3    miconazole (ZEASORB-AF) 2 % powder, Apply topically 2 times daily. , Disp: 45 g, Rfl: 1    Magic Mouthwash (MIRACLE MOUTHWASH), Swish and spit 5 mLs 4 times daily as needed for Irritation, Disp: , Rfl:   Chlorhexidine gluconate and Penicillins  Social History   Substance Use Topics    Smoking status: Former Smoker     Packs/day: 1.00     Years: 15.00    Smokeless tobacco: Never Used    Alcohol use No     No family history on file.    Review of Systems   Constitutional: Negative for chills and fever. HENT: Negative for ear discharge and hearing loss. Eyes: Negative for blurred vision and double vision. Respiratory: Negative for cough and shortness of breath. Cardiovascular: Negative for chest pain and palpitations. Gastrointestinal: Negative for heartburn and vomiting. Skin: Negative for itching and rash. Neurological: Negative for dizziness, tingling and headaches. Endo/Heme/Allergies: Negative for environmental allergies. Does not bruise/bleed easily. All other systems reviewed and are negative. Ht 4' 11\" (1.499 m)   Wt 121 lb (54.9 kg)   Breastfeeding? No   BMI 24.44 kg/m²   Physical Exam   Constitutional: She is oriented to person, place, and time. She appears well-developed and well-nourished. HENT:   Head: Normocephalic and atraumatic. Right Ear: Hearing, tympanic membrane and ear canal normal.   Left Ear: Hearing, tympanic membrane, external ear and ear canal normal.   Nose: Mucosal edema and rhinorrhea present. Mouth/Throat: Uvula is midline. Normal dentition. No dental caries. No oropharyngeal exudate or posterior oropharyngeal edema. Significant intraoral radiation changes, stable. Mild dryness of bilateral external ear canals. Eyes: Pupils are equal, round, and reactive to light. Conjunctivae and EOM are normal.   Neck: Normal range of motion. Neck supple. Cardiovascular: Normal rate, regular rhythm, normal heart sounds and intact distal pulses. Pulmonary/Chest: Effort normal and breath sounds normal.   Abdominal: Soft. Bowel sounds are normal.   Musculoskeletal: Normal range of motion. Neurological: She is alert and oriented to person, place, and time. Skin: Skin is warm and dry. Psychiatric: She has a normal mood and affect. Her behavior is normal. Judgment and thought content normal.   Nursing note and vitals reviewed. Diagnosis Orders   1.  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 Ear itching       IMPRESSION/PLAN:  Patient was a T3 N1 AM acts squamous cell carcinoma of the right retromolar trigone status post chemoradiation over 3 year sarabjit without recurrence. Continue close follow-up every 6 months, or sooner with any noted changes of difficulty swallowing neck mass or areas that are bleeding. Advised baby oil or mineral oil can be used to help with itching of the ears. If any new or worsening symptoms call the office to be seen sooner, or report to the emergency department, if needed. This note was done using voice recognition software. There may be accidental errors in grammar or spelling. Jorene Schirmer, PA Dr. Madeleine Bianchi D. Bunevich D.O. Ms. Sydna Riffle.  Otolaryngology Facial Plastic Surgery  : [facility] Otolaryngology/Facial Plastic Surgery Residency    Associate Clinical Professor: [facility]

## 2018-12-17 ENCOUNTER — HOSPITAL ENCOUNTER (OUTPATIENT)
Age: 83
Discharge: HOME | End: 2018-12-17
Payer: COMMERCIAL

## 2018-12-17 LAB
ALBUMIN SERPL-MCNC: 3.3 G/DL (ref 3.5–5.2)
ALP BLD-CCNC: 197 U/L (ref 35–104)
ALT SERPL-CCNC: 24 U/L (ref 0–32)
ANION GAP SERPL CALCULATED.3IONS-SCNC: 16 MMOL/L (ref 7–16)
AST SERPL-CCNC: 42 U/L (ref 0–31)
BASOPHILS ABSOLUTE: 0.03 E9/L (ref 0–0.2)
BASOPHILS RELATIVE PERCENT: 0.6 % (ref 0–2)
BILIRUB SERPL-MCNC: <0.2 MG/DL (ref 0–1.2)
BUN BLDV-MCNC: 21 MG/DL (ref 8–23)
CALCIUM SERPL-MCNC: 9.4 MG/DL (ref 8.6–10.2)
CHLORIDE BLD-SCNC: 97 MMOL/L (ref 98–107)
CO2: 22 MMOL/L (ref 22–29)
CREAT SERPL-MCNC: 1.6 MG/DL (ref 0.5–1)
EOSINOPHILS ABSOLUTE: 0.25 E9/L (ref 0.05–0.5)
EOSINOPHILS RELATIVE PERCENT: 4.7 % (ref 0–6)
GFR AFRICAN AMERICAN: 36
GFR NON-AFRICAN AMERICAN: 36 ML/MIN/1.73
GLUCOSE BLD-MCNC: 115 MG/DL (ref 74–99)
HCT VFR BLD CALC: 36.6 % (ref 34–48)
HEMOGLOBIN: 11.1 G/DL (ref 11.5–15.5)
IMMATURE GRANULOCYTES #: 0.02 E9/L
IMMATURE GRANULOCYTES %: 0.4 % (ref 0–5)
LYMPHOCYTES ABSOLUTE: 2.95 E9/L (ref 1.5–4)
LYMPHOCYTES RELATIVE PERCENT: 55.9 % (ref 20–42)
MCH RBC QN AUTO: 31.8 PG (ref 26–35)
MCHC RBC AUTO-ENTMCNC: 30.3 % (ref 32–34.5)
MCV RBC AUTO: 104.9 FL (ref 80–99.9)
MONOCYTES ABSOLUTE: 0.51 E9/L (ref 0.1–0.95)
MONOCYTES RELATIVE PERCENT: 9.7 % (ref 2–12)
NEUTROPHILS ABSOLUTE: 1.52 E9/L (ref 1.8–7.3)
NEUTROPHILS RELATIVE PERCENT: 28.7 % (ref 43–80)
PDW BLD-RTO: 12.4 FL (ref 11.5–15)
PLATELET # BLD: 192 E9/L (ref 130–450)
PMV BLD AUTO: 9.9 FL (ref 7–12)
POTASSIUM SERPL-SCNC: 4.4 MMOL/L (ref 3.5–5)
RBC # BLD: 3.49 E12/L (ref 3.5–5.5)
SEDIMENTATION RATE, ERYTHROCYTE: 89 MM/HR (ref 0–20)
SODIUM BLD-SCNC: 135 MMOL/L (ref 132–146)
TOTAL PROTEIN: 8 G/DL (ref 6.4–8.3)
WBC # BLD: 5.3 E9/L (ref 4.5–11.5)

## 2018-12-17 PROCEDURE — 85025 COMPLETE CBC W/AUTO DIFF WBC: CPT

## 2018-12-17 PROCEDURE — 36415 COLL VENOUS BLD VENIPUNCTURE: CPT

## 2018-12-17 PROCEDURE — 85651 RBC SED RATE NONAUTOMATED: CPT

## 2018-12-17 PROCEDURE — 80053 COMPREHEN METABOLIC PANEL: CPT

## 2019-02-07 ENCOUNTER — OFFICE VISIT (OUTPATIENT)
Dept: PALLATIVE CARE | Age: 84
End: 2019-02-07
Payer: COMMERCIAL

## 2019-02-07 VITALS
SYSTOLIC BLOOD PRESSURE: 128 MMHG | HEART RATE: 68 BPM | BODY MASS INDEX: 25.25 KG/M2 | DIASTOLIC BLOOD PRESSURE: 60 MMHG | OXYGEN SATURATION: 97 % | WEIGHT: 125 LBS

## 2019-02-07 DIAGNOSIS — K59.00 CONSTIPATION, UNSPECIFIED CONSTIPATION TYPE: ICD-10-CM

## 2019-02-07 DIAGNOSIS — C06.9 SQUAMOUS CELL CARCINOMA OF ORAL CAVITY (HCC): Primary | ICD-10-CM

## 2019-02-07 DIAGNOSIS — Z51.5 PALLIATIVE CARE BY SPECIALIST: ICD-10-CM

## 2019-02-07 PROCEDURE — 99212 OFFICE O/P EST SF 10 MIN: CPT | Performed by: FAMILY MEDICINE

## 2019-02-07 PROCEDURE — 99212 OFFICE O/P EST SF 10 MIN: CPT

## 2019-02-07 PROCEDURE — 4040F PNEUMOC VAC/ADMIN/RCVD: CPT | Performed by: FAMILY MEDICINE

## 2019-02-07 PROCEDURE — G8484 FLU IMMUNIZE NO ADMIN: HCPCS | Performed by: FAMILY MEDICINE

## 2019-02-07 PROCEDURE — G8427 DOCREV CUR MEDS BY ELIG CLIN: HCPCS | Performed by: FAMILY MEDICINE

## 2019-02-07 PROCEDURE — 1036F TOBACCO NON-USER: CPT | Performed by: FAMILY MEDICINE

## 2019-02-07 PROCEDURE — 1101F PT FALLS ASSESS-DOCD LE1/YR: CPT | Performed by: FAMILY MEDICINE

## 2019-02-07 PROCEDURE — 1123F ACP DISCUSS/DSCN MKR DOCD: CPT | Performed by: FAMILY MEDICINE

## 2019-02-07 PROCEDURE — G8417 CALC BMI ABV UP PARAM F/U: HCPCS | Performed by: FAMILY MEDICINE

## 2019-02-07 PROCEDURE — 99214 OFFICE O/P EST MOD 30 MIN: CPT | Performed by: FAMILY MEDICINE

## 2019-02-07 PROCEDURE — 1090F PRES/ABSN URINE INCON ASSESS: CPT | Performed by: FAMILY MEDICINE

## 2019-02-11 ENCOUNTER — TELEPHONE (OUTPATIENT)
Dept: ENT CLINIC | Age: 84
End: 2019-02-11

## 2019-02-12 ENCOUNTER — OFFICE VISIT (OUTPATIENT)
Dept: ENT CLINIC | Age: 84
End: 2019-02-12
Payer: COMMERCIAL

## 2019-02-12 VITALS — WEIGHT: 125 LBS | BODY MASS INDEX: 26.97 KG/M2 | HEIGHT: 57 IN

## 2019-02-12 DIAGNOSIS — H90.3 SENSORINEURAL HEARING LOSS (SNHL) OF BOTH EARS: Primary | ICD-10-CM

## 2019-02-12 PROCEDURE — 1090F PRES/ABSN URINE INCON ASSESS: CPT | Performed by: OTOLARYNGOLOGY

## 2019-02-12 PROCEDURE — 1123F ACP DISCUSS/DSCN MKR DOCD: CPT | Performed by: OTOLARYNGOLOGY

## 2019-02-12 PROCEDURE — 1036F TOBACCO NON-USER: CPT | Performed by: OTOLARYNGOLOGY

## 2019-02-12 PROCEDURE — 1101F PT FALLS ASSESS-DOCD LE1/YR: CPT | Performed by: OTOLARYNGOLOGY

## 2019-02-12 PROCEDURE — G8484 FLU IMMUNIZE NO ADMIN: HCPCS | Performed by: OTOLARYNGOLOGY

## 2019-02-12 PROCEDURE — 99213 OFFICE O/P EST LOW 20 MIN: CPT | Performed by: OTOLARYNGOLOGY

## 2019-02-12 PROCEDURE — G8417 CALC BMI ABV UP PARAM F/U: HCPCS | Performed by: OTOLARYNGOLOGY

## 2019-02-12 PROCEDURE — 4040F PNEUMOC VAC/ADMIN/RCVD: CPT | Performed by: OTOLARYNGOLOGY

## 2019-02-12 PROCEDURE — G8427 DOCREV CUR MEDS BY ELIG CLIN: HCPCS | Performed by: OTOLARYNGOLOGY

## 2019-02-13 ENCOUNTER — TELEPHONE (OUTPATIENT)
Dept: PALLATIVE CARE | Age: 84
End: 2019-02-13

## 2019-02-21 ENCOUNTER — APPOINTMENT (OUTPATIENT)
Dept: GENERAL RADIOLOGY | Age: 84
End: 2019-02-21
Payer: COMMERCIAL

## 2019-02-21 ENCOUNTER — HOSPITAL ENCOUNTER (EMERGENCY)
Age: 84
Discharge: HOME | End: 2019-02-21
Attending: EMERGENCY MEDICINE
Payer: COMMERCIAL

## 2019-02-21 ENCOUNTER — APPOINTMENT (OUTPATIENT)
Dept: CT IMAGING | Age: 84
End: 2019-02-21
Payer: COMMERCIAL

## 2019-02-21 VITALS
WEIGHT: 130.06 LBS | RESPIRATION RATE: 16 BRPM | DIASTOLIC BLOOD PRESSURE: 69 MMHG | BODY MASS INDEX: 28.06 KG/M2 | HEIGHT: 57 IN | SYSTOLIC BLOOD PRESSURE: 172 MMHG | TEMPERATURE: 98.3 F | OXYGEN SATURATION: 100 % | HEART RATE: 91 BPM

## 2019-02-21 DIAGNOSIS — S09.90XA INJURY OF HEAD, INITIAL ENCOUNTER: Primary | ICD-10-CM

## 2019-02-21 DIAGNOSIS — T14.8XXA ABRASION: ICD-10-CM

## 2019-02-21 DIAGNOSIS — S60.222A CONTUSION OF LEFT HAND, INITIAL ENCOUNTER: ICD-10-CM

## 2019-02-21 PROCEDURE — 73502 X-RAY EXAM HIP UNI 2-3 VIEWS: CPT

## 2019-02-21 PROCEDURE — 73130 X-RAY EXAM OF HAND: CPT

## 2019-02-21 PROCEDURE — 72125 CT NECK SPINE W/O DYE: CPT

## 2019-02-21 PROCEDURE — 70450 CT HEAD/BRAIN W/O DYE: CPT

## 2019-02-21 PROCEDURE — 99284 EMERGENCY DEPT VISIT MOD MDM: CPT

## 2019-02-21 ASSESSMENT — ENCOUNTER SYMPTOMS
BACK PAIN: 0
SHORTNESS OF BREATH: 0
ABDOMINAL PAIN: 0
COUGH: 0
DIARRHEA: 0
WHEEZING: 0
NAUSEA: 0
CHEST TIGHTNESS: 0
VOMITING: 0
SORE THROAT: 0

## 2019-02-28 ASSESSMENT — ENCOUNTER SYMPTOMS
VOMITING: 0
SORE THROAT: 1
SHORTNESS OF BREATH: 0
COUGH: 0

## 2019-04-20 ENCOUNTER — APPOINTMENT (OUTPATIENT)
Dept: CT IMAGING | Age: 84
End: 2019-04-20
Payer: COMMERCIAL

## 2019-04-20 ENCOUNTER — APPOINTMENT (OUTPATIENT)
Dept: GENERAL RADIOLOGY | Age: 84
End: 2019-04-20
Payer: COMMERCIAL

## 2019-04-20 ENCOUNTER — HOSPITAL ENCOUNTER (EMERGENCY)
Age: 84
Discharge: HOME | End: 2019-04-20
Attending: EMERGENCY MEDICINE
Payer: COMMERCIAL

## 2019-04-20 VITALS
TEMPERATURE: 97.3 F | RESPIRATION RATE: 20 BRPM | HEART RATE: 76 BPM | SYSTOLIC BLOOD PRESSURE: 152 MMHG | BODY MASS INDEX: 25.2 KG/M2 | OXYGEN SATURATION: 94 % | DIASTOLIC BLOOD PRESSURE: 50 MMHG | HEIGHT: 59 IN | WEIGHT: 125 LBS

## 2019-04-20 DIAGNOSIS — N28.9 RENAL INSUFFICIENCY: Primary | ICD-10-CM

## 2019-04-20 LAB
ALBUMIN SERPL-MCNC: 3 G/DL (ref 3.5–5.2)
ALP BLD-CCNC: 253 U/L (ref 35–104)
ALT SERPL-CCNC: 24 U/L (ref 0–32)
ANION GAP SERPL CALCULATED.3IONS-SCNC: 8 MMOL/L (ref 7–16)
AST SERPL-CCNC: 46 U/L (ref 0–31)
BACTERIA: NORMAL /HPF
BASOPHILS ABSOLUTE: 0.03 E9/L (ref 0–0.2)
BASOPHILS RELATIVE PERCENT: 0.5 % (ref 0–2)
BILIRUB SERPL-MCNC: 0.3 MG/DL (ref 0–1.2)
BILIRUBIN URINE: NEGATIVE
BLOOD, URINE: NEGATIVE
BUN BLDV-MCNC: 23 MG/DL (ref 8–23)
CALCIUM SERPL-MCNC: 9.3 MG/DL (ref 8.6–10.2)
CHLORIDE BLD-SCNC: 102 MMOL/L (ref 98–107)
CLARITY: CLEAR
CO2: 27 MMOL/L (ref 22–29)
COLOR: YELLOW
CREAT SERPL-MCNC: 1.4 MG/DL (ref 0.5–1)
EKG ATRIAL RATE: 72 BPM
EKG P AXIS: 46 DEGREES
EKG P-R INTERVAL: 150 MS
EKG Q-T INTERVAL: 374 MS
EKG QRS DURATION: 84 MS
EKG QTC CALCULATION (BAZETT): 409 MS
EKG R AXIS: 26 DEGREES
EKG T AXIS: 39 DEGREES
EKG VENTRICULAR RATE: 72 BPM
EOSINOPHILS ABSOLUTE: 0.1 E9/L (ref 0.05–0.5)
EOSINOPHILS RELATIVE PERCENT: 1.7 % (ref 0–6)
EPITHELIAL CELLS, UA: NORMAL /HPF
GFR AFRICAN AMERICAN: 42
GFR NON-AFRICAN AMERICAN: 42 ML/MIN/1.73
GLUCOSE BLD-MCNC: 92 MG/DL (ref 74–99)
GLUCOSE URINE: NEGATIVE MG/DL
HCT VFR BLD CALC: 37.5 % (ref 34–48)
HEMOGLOBIN: 12 G/DL (ref 11.5–15.5)
IMMATURE GRANULOCYTES #: 0.01 E9/L
IMMATURE GRANULOCYTES %: 0.2 % (ref 0–5)
KETONES, URINE: NEGATIVE MG/DL
LACTIC ACID: 1.4 MMOL/L (ref 0.5–2.2)
LEUKOCYTE ESTERASE, URINE: NEGATIVE
LIPASE: 8 U/L (ref 13–60)
LYMPHOCYTES ABSOLUTE: 3.03 E9/L (ref 1.5–4)
LYMPHOCYTES RELATIVE PERCENT: 52.3 % (ref 20–42)
MCH RBC QN AUTO: 32.1 PG (ref 26–35)
MCHC RBC AUTO-ENTMCNC: 32 % (ref 32–34.5)
MCV RBC AUTO: 100.3 FL (ref 80–99.9)
MONOCYTES ABSOLUTE: 0.6 E9/L (ref 0.1–0.95)
MONOCYTES RELATIVE PERCENT: 10.4 % (ref 2–12)
NEUTROPHILS ABSOLUTE: 2.02 E9/L (ref 1.8–7.3)
NEUTROPHILS RELATIVE PERCENT: 34.9 % (ref 43–80)
NITRITE, URINE: NEGATIVE
PDW BLD-RTO: 12.4 FL (ref 11.5–15)
PH UA: 6.5 (ref 5–9)
PLATELET # BLD: 191 E9/L (ref 130–450)
PMV BLD AUTO: 10.3 FL (ref 7–12)
POTASSIUM SERPL-SCNC: 4.2 MMOL/L (ref 3.5–5)
PROTEIN UA: NEGATIVE MG/DL
RBC # BLD: 3.74 E12/L (ref 3.5–5.5)
RBC UA: NORMAL /HPF (ref 0–2)
REASON FOR REJECTION: NORMAL
REASON FOR REJECTION: NORMAL
REJECTED TEST: NORMAL
REJECTED TEST: NORMAL
SODIUM BLD-SCNC: 137 MMOL/L (ref 132–146)
SPECIFIC GRAVITY UA: 1.01 (ref 1–1.03)
TOTAL PROTEIN: 8 G/DL (ref 6.4–8.3)
TROPONIN: <0.01 NG/ML (ref 0–0.03)
UROBILINOGEN, URINE: 0.2 E.U./DL
WBC # BLD: 5.8 E9/L (ref 4.5–11.5)
WBC UA: NORMAL /HPF (ref 0–5)

## 2019-04-20 PROCEDURE — 85025 COMPLETE CBC W/AUTO DIFF WBC: CPT

## 2019-04-20 PROCEDURE — 93005 ELECTROCARDIOGRAM TRACING: CPT | Performed by: EMERGENCY MEDICINE

## 2019-04-20 PROCEDURE — 84484 ASSAY OF TROPONIN QUANT: CPT

## 2019-04-20 PROCEDURE — 81001 URINALYSIS AUTO W/SCOPE: CPT

## 2019-04-20 PROCEDURE — 71045 X-RAY EXAM CHEST 1 VIEW: CPT

## 2019-04-20 PROCEDURE — 80053 COMPREHEN METABOLIC PANEL: CPT

## 2019-04-20 PROCEDURE — 83605 ASSAY OF LACTIC ACID: CPT

## 2019-04-20 PROCEDURE — 70450 CT HEAD/BRAIN W/O DYE: CPT

## 2019-04-20 PROCEDURE — 83690 ASSAY OF LIPASE: CPT

## 2019-04-20 PROCEDURE — 36415 COLL VENOUS BLD VENIPUNCTURE: CPT

## 2019-04-20 PROCEDURE — 99285 EMERGENCY DEPT VISIT HI MDM: CPT

## 2019-04-20 NOTE — ED PROVIDER NOTES
The patient is a 8-year-old female with a past history of asthma, cancer, who presents to the ED from home for the chief complaint of altered mental status, fatigue. Onset started approximately 2 days ago. The patient is very hard of hearing and the history is primarily obtained through the patient's daughter and family. It is reported that she has been more fatigued and slightly more confused than usual. She is currently awake, alert and oriented to name only. She does follow commands and answer simple questions. She currently has no complaints. The daughter feels as though she may be more weak than normal and states she has been having difficulty walking and feel she has weakness in both of her legs. She is also concerned about her having a urinary tract infection as she has had increased urinary frequency over the past couple of days. They deny any reports of fever, cough, chest pain, shortness of breath, abdominal pain, vomiting or diarrhea. Denies any fall. They deny any focal or unilateral weakness, slurred speech or facial droop. The history is provided by the patient and a relative. Review of Systems   Unable to perform ROS: Mental status change     BP (!) 152/50   Pulse 76   Temp 97.3 °F (36.3 °C) (Oral)   Resp 20   Ht 4' 11\" (1.499 m)   Wt 125 lb (56.7 kg)   SpO2 94%   BMI 25.25 kg/m²     Physical Exam   Constitutional: She appears cachectic. No distress. 8-year-old female laying in bed in no acute distress. She is elderly and frail and cachectic appearing. Family is present at bedside. HENT:   Head: Normocephalic and atraumatic. Right Ear: External ear normal.   Left Ear: External ear normal.   Nose: Nose normal.   Mouth/Throat: Oropharynx is clear and moist. No oropharyngeal exudate. Eyes: Pupils are equal, round, and reactive to light. Conjunctivae are normal. Right eye exhibits no discharge. Left eye exhibits no discharge. No scleral icterus.    Neck: Normal range of motion. Neck supple. No JVD present. No tracheal deviation present. Cardiovascular: Normal rate, regular rhythm, normal heart sounds and intact distal pulses. No murmur heard. Pulses:       Radial pulses are 2+ on the right side, and 2+ on the left side. No bilateral lower extremity pitting edema. Pulmonary/Chest: Effort normal and breath sounds normal. No respiratory distress. She has no wheezes. She has no rales. Normal effort. No respiratory distress. SPO2 94% on room air. Abdominal: Soft. Bowel sounds are normal. She exhibits no distension. There is no tenderness. There is no rebound and no guarding. Musculoskeletal: She exhibits no edema. Lymphadenopathy:     She has no cervical adenopathy. Neurological: She is alert. She has normal strength. She is disoriented. She displays no tremor. No cranial nerve deficit or sensory deficit. She exhibits normal muscle tone. She displays no seizure activity. Coordination normal.   She is awake, alert and oriented to name only. She is very hard of hearing but follows simple commands. She moves all limbs. Strength strong and symmetric in upper and lower extremities bilaterally. Sensation is intact to light touch and symmetric throughout. No tremulous or seizure-like activity. Skin: Skin is warm and dry. She is not diaphoretic. Nursing note and vitals reviewed. Procedures    MDM    ED Course as of Apr 20 1713   Sat Apr 20, 2019   1348 ATTENDING PROVIDER ATTESTATION:     I have personally performed and/or participated in the history, exam, medical decision making, and procedures and agree with all pertinent clinical information unless otherwise noted. I have also reviewed and agree with the past medical, family and social history unless otherwise noted.     I have discussed this patient in detail with the resident, and provided the instruction and education regarding patient here family, brought in for slight confusion with urinary frequency and Negative Negative    pH, UA 6.5 5.0 - 9.0    Protein, UA Negative Negative mg/dL    Urobilinogen, Urine 0.2 <2.0 E.U./dL    Nitrite, Urine Negative Negative    Leukocyte Esterase, Urine Negative Negative    WBC, UA 0-1 0 - 5 /HPF    RBC, UA 0-1 0 - 2 /HPF    Epi Cells RARE /HPF    Bacteria, UA NONE /HPF   SPECIMEN REJECTION   Result Value Ref Range    Rejected Test CMP TROP LIPAS     Reason for Rejection see below    Comprehensive metabolic panel   Result Value Ref Range    Sodium 137 132 - 146 mmol/L    Potassium 4.2 3.5 - 5.0 mmol/L    Chloride 102 98 - 107 mmol/L    CO2 27 22 - 29 mmol/L    Anion Gap 8 7 - 16 mmol/L    Glucose 92 74 - 99 mg/dL    BUN 23 8 - 23 mg/dL    CREATININE 1.4 (H) 0.5 - 1.0 mg/dL    GFR Non-African American 42 >=60 mL/min/1.73    GFR African American 42     Calcium 9.3 8.6 - 10.2 mg/dL    Total Protein 8.0 6.4 - 8.3 g/dL    Alb 3.0 (L) 3.5 - 5.2 g/dL    Total Bilirubin 0.3 0.0 - 1.2 mg/dL    Alkaline Phosphatase 253 (H) 35 - 104 U/L    ALT 24 0 - 32 U/L    AST 46 (H) 0 - 31 U/L   Lipase   Result Value Ref Range    Lipase 8 (L) 13 - 60 U/L   Troponin   Result Value Ref Range    Troponin <0.01 0.00 - 0.03 ng/mL   SPECIMEN REJECTION   Result Value Ref Range    Rejected Test CMP TROP LIPAS     Reason for Rejection see below    EKG 12 Lead   Result Value Ref Range    Ventricular Rate 72 BPM    Atrial Rate 72 BPM    P-R Interval 150 ms    QRS Duration 84 ms    Q-T Interval 374 ms    QTc Calculation (Bazett) 409 ms    P Axis 46 degrees    R Axis 26 degrees    T Axis 39 degrees       Radiology:  CT Head WO Contrast   Final Result   No CT evidence of acute intracranial abnormality. Note: Multifocal sinus disease.          XR CHEST PORTABLE   Final Result   No acute cardiopulmonary disease.             ------------------------- NURSING NOTES AND VITALS REVIEWED ---------------------------  Date / Time Roomed:  4/20/2019  1:14 PM  ED Bed Assignment:  08/08    The nursing notes within the ED encounter and vital signs as below have been reviewed. BP (!) 152/50   Pulse 76   Temp 97.3 °F (36.3 °C) (Oral)   Resp 20   Ht 4' 11\" (1.499 m)   Wt 125 lb (56.7 kg)   SpO2 94%   BMI 25.25 kg/m²   Oxygen Saturation Interpretation: Normal      ------------------------------------------ PROGRESS NOTES ------------------------------------------  I have spoken with the patient and family and discussed todays results, in addition to providing specific details for the plan of care and counseling regarding the diagnosis and prognosis. Their questions are answered at this time and they are agreeable with the plan. I discussed at length with them reasons for immediate return here for re evaluation. They will followup with primary care by calling their office tomorrow. --------------------------------- ADDITIONAL PROVIDER NOTES ---------------------------------  At this time the patient is without objective evidence of an acute process requiring hospitalization or inpatient management. They have remained hemodynamically stable throughout their entire ED visit and are stable for discharge with outpatient follow-up. The plan has been discussed in detail and they are aware of the specific conditions for emergent return, as well as the importance of follow-up. New Prescriptions    No medications on file       Diagnosis:  1. Renal insufficiency Controlled       Disposition:  Patient's disposition: Discharge to home  Patient's condition is stable.         Nyla Stark MD  04/20/19 0433

## 2019-05-08 ENCOUNTER — HOSPITAL ENCOUNTER (EMERGENCY)
Age: 84
Discharge: HOME | End: 2019-05-08
Attending: EMERGENCY MEDICINE
Payer: COMMERCIAL

## 2019-05-08 VITALS
OXYGEN SATURATION: 98 % | SYSTOLIC BLOOD PRESSURE: 142 MMHG | TEMPERATURE: 98.9 F | BODY MASS INDEX: 25.6 KG/M2 | DIASTOLIC BLOOD PRESSURE: 78 MMHG | HEART RATE: 85 BPM | HEIGHT: 59 IN | WEIGHT: 127 LBS | RESPIRATION RATE: 16 BRPM

## 2019-05-08 DIAGNOSIS — K64.5 THROMBOSED HEMORRHOIDS: Primary | ICD-10-CM

## 2019-05-08 PROCEDURE — 99283 EMERGENCY DEPT VISIT LOW MDM: CPT

## 2019-05-08 PROCEDURE — 46083 INC THROMBOSED HROID XTRNL: CPT

## 2019-05-08 RX ORDER — ONDANSETRON 4 MG/1
4 TABLET, ORALLY DISINTEGRATING ORAL EVERY 8 HOURS PRN
Qty: 10 TABLET | Refills: 0 | Status: ON HOLD | OUTPATIENT
Start: 2019-05-08 | End: 2020-01-31

## 2019-05-08 RX ORDER — DOCUSATE SODIUM 100 MG/1
100 CAPSULE, LIQUID FILLED ORAL 2 TIMES DAILY
Qty: 14 CAPSULE | Refills: 0 | Status: SHIPPED | OUTPATIENT
Start: 2019-05-08 | End: 2019-05-15

## 2019-05-08 RX ORDER — HYDROCODONE BITARTRATE AND ACETAMINOPHEN 5; 325 MG/1; MG/1
1 TABLET ORAL ONCE
Status: DISCONTINUED | OUTPATIENT
Start: 2019-05-08 | End: 2019-05-08 | Stop reason: HOSPADM

## 2019-05-08 RX ORDER — ONDANSETRON 4 MG/1
4 TABLET, ORALLY DISINTEGRATING ORAL ONCE
Status: DISCONTINUED | OUTPATIENT
Start: 2019-05-08 | End: 2019-05-08 | Stop reason: HOSPADM

## 2019-05-08 RX ORDER — HYDROCODONE BITARTRATE AND ACETAMINOPHEN 5; 325 MG/1; MG/1
1 TABLET ORAL EVERY 6 HOURS PRN
Qty: 10 TABLET | Refills: 0 | Status: SHIPPED | OUTPATIENT
Start: 2019-05-08 | End: 2019-05-10

## 2019-05-08 ASSESSMENT — ENCOUNTER SYMPTOMS
SHORTNESS OF BREATH: 0
DIARRHEA: 0
CHEST TIGHTNESS: 0
VOMITING: 0
ABDOMINAL PAIN: 0
ANAL BLEEDING: 1
CONSTIPATION: 0
NAUSEA: 0
COUGH: 0
ALLERGIC/IMMUNOLOGIC NEGATIVE: 1
RECTAL PAIN: 1

## 2019-05-08 NOTE — ED PROVIDER NOTES
This is a 8-year-old female with past history of esophageal cancer and asthma presenting to the emergency department for rectal bleeding. History is taken from the patient and the patient's daughter who is at the bedside. They report onset of bleeding a few weeks ago. She has been seen by her primary care physician for this and was prescribed Proctofoam which has relieved pain but her hemorrhoid has persisted. The daughter states that over the past 36 hours she has noted bright red blood in the toilet and the patient has been spending a lot of time on the toilet. Patient states the bleeding is not painful but feels like there is a ball near her anus where the blood is coming from. She reports no associated headache, changes in vision, chest pain, shortness of breath, weakness, fatigue associated with these symptoms. Review of Systems   Constitutional: Negative for chills and fever. HENT: Negative for congestion. Eyes: Negative for visual disturbance. Respiratory: Negative for cough, chest tightness and shortness of breath. Cardiovascular: Negative for chest pain, palpitations and leg swelling. Gastrointestinal: Positive for anal bleeding and rectal pain. Negative for abdominal pain, constipation, diarrhea, nausea and vomiting. Endocrine: Negative. Genitourinary: Negative for dysuria, frequency, hematuria and urgency. Musculoskeletal: Negative for arthralgias. Skin: Negative. Allergic/Immunologic: Negative. Neurological: Negative for dizziness, weakness and headaches. Hematological: Negative. Psychiatric/Behavioral: Negative. Physical Exam   Constitutional: She is oriented to person, place, and time. She appears well-developed and well-nourished. No distress. HENT:   Head: Normocephalic and atraumatic. Right Ear: External ear normal.   Left Ear: External ear normal.   Nose: Nose normal.   Mouth/Throat: Oropharynx is clear and moist. No oropharyngeal exudate. Absent dentition   Eyes: Pupils are equal, round, and reactive to light. Conjunctivae and EOM are normal. Right eye exhibits no discharge. Left eye exhibits no discharge. Neck: Normal range of motion. Neck supple. No JVD present. No tracheal deviation present. No thyromegaly present. Cardiovascular: Normal rate, regular rhythm, normal heart sounds and intact distal pulses. Exam reveals no gallop and no friction rub. No murmur heard. Pulmonary/Chest: Effort normal and breath sounds normal. No respiratory distress. She has no wheezes. She has no rales. Abdominal: Soft. Bowel sounds are normal. She exhibits no distension. There is no tenderness. There is no rebound and no guarding. Genitourinary:   Genitourinary Comments: Zonation of the anus with female nurse at the bedside demonstrates a fairly large thrombosed external hemorrhoid at 3:00 position. Active oozing of blood noted. Musculoskeletal: Normal range of motion. She exhibits no edema. Lymphadenopathy:     She has no cervical adenopathy. Neurological: She is alert and oriented to person, place, and time. Skin: Skin is warm and dry. She is not diaphoretic. Psychiatric: She has a normal mood and affect. Her behavior is normal. Judgment and thought content normal.   Vitals reviewed. Procedures  The patient was prepped in the right lateral, position with Betadine. Anesthesia achieved with 1% lidocaine in her rectal area. Incision was made in the apex of the thrombosed hemorrhoid and a fairly large blood clot was removed. There is a small amount of blood oozing, direct pressure was applied. Dressed with gauze. Patient tolerated the procedure well. MDM  Number of Diagnoses or Management Options  Thrombosed hemorrhoids:   Diagnosis management comments: 8-year-old female with the above stated history, signs and symptoms. We will obtain informed consent for incision of thrombosed external hemorrhoid. See above procedure note.  Plan is for discharge home with pain medication and stool softener. --------------------------------------------- PAST HISTORY ---------------------------------------------  Past Medical History:  has a past medical history of Asthma, Cancer ([address] and Influenza B. Past Surgical History:  has a past surgical history that includes Cataract removal.    Social History:  reports that she has quit smoking. She has a 15.00 pack-year smoking history. She has never used smokeless tobacco. She reports that she does not drink alcohol or use drugs. Family History: family history is not on file. The patients home medications have been reviewed. Allergies: Chlorhexidine gluconate and Penicillins    -------------------------------------------------- RESULTS -------------------------------------------------  Labs:  No results found for this visit on 05/08/19. Radiology:  No orders to display       ------------------------- NURSING NOTES AND VITALS REVIEWED ---------------------------  Date / Time Roomed:  5/8/2019  4:36 PM  ED Bed Assignment:  10/10    The nursing notes within the ED encounter and vital signs as below have been reviewed. BP (!) 142/78   Pulse 85   Temp 98.9 °F (37.2 °C)   Resp 16   Ht 4' 11\" (1.499 m)   Wt 127 lb (57.6 kg)   SpO2 98%   BMI 25.65 kg/m²   Oxygen Saturation Interpretation: Normal      ------------------------------------------ PROGRESS NOTES ------------------------------------------  11:40 PM  I have spoken with the patient and discussed todays results, in addition to providing specific details for the plan of care and counseling regarding the diagnosis and prognosis. Their questions are answered at this time and they are agreeable with the plan. I discussed at length with them reasons for immediate return here for re evaluation.  They will followup with PCP.       --------------------------------- ADDITIONAL PROVIDER NOTES ---------------------------------  At this time the patient is without objective evidence of an acute process requiring hospitalization or inpatient management. They have remained hemodynamically stable throughout their entire ED visit and are stable for discharge with outpatient follow-up. The plan has been discussed in detail and they are aware of the specific conditions for emergent return, as well as the importance of follow-up. Discharge Medication List as of 5/8/2019  8:11 PM      START taking these medications    Details   HYDROcodone-acetaminophen (NORCO) 5-325 MG per tablet Take 1 tablet by mouth every 6 hours as needed for Pain for up to 2 days. Intended supply: 3 days. Take lowest dose possible to manage pain, Disp-10 tablet, R-0Print      ondansetron (ZOFRAN ODT) 4 MG disintegrating tablet Take 1 tablet by mouth every 8 hours as needed for Nausea or Vomiting, Disp-10 tablet, R-0Print      docusate sodium (COLACE) 100 MG capsule Take 1 capsule by mouth 2 times daily for 7 days, Disp-14 capsule, R-0Print             Diagnosis:  1. Thrombosed hemorrhoids        Disposition:  Patient's disposition: Discharge to home  Patient's condition is stable.             Dimitrios Valladares,   Resident  05/08/19 [id number]

## 2019-05-09 NOTE — ED NOTES
Pt refusing pain medication at this time     Consuelo Ribera, [place]  05/08/19 2023 2 = assistive person

## 2019-08-01 ENCOUNTER — OFFICE VISIT (OUTPATIENT)
Dept: PALLATIVE CARE | Age: 84
End: 2019-08-01
Payer: COMMERCIAL

## 2019-08-01 ENCOUNTER — TELEPHONE (OUTPATIENT)
Dept: PALLATIVE CARE | Age: 84
End: 2019-08-01

## 2019-08-01 VITALS — SYSTOLIC BLOOD PRESSURE: 139 MMHG | HEART RATE: 90 BPM | DIASTOLIC BLOOD PRESSURE: 55 MMHG

## 2019-08-01 DIAGNOSIS — R54 AGE-RELATED PHYSICAL DEBILITY: ICD-10-CM

## 2019-08-01 DIAGNOSIS — Z51.5 PALLIATIVE CARE BY SPECIALIST: Primary | ICD-10-CM

## 2019-08-01 DIAGNOSIS — C06.9 SQUAMOUS CELL CARCINOMA OF ORAL CAVITY (HCC): ICD-10-CM

## 2019-08-01 PROCEDURE — 99214 OFFICE O/P EST MOD 30 MIN: CPT | Performed by: NURSE PRACTITIONER

## 2019-08-01 PROCEDURE — 99212 OFFICE O/P EST SF 10 MIN: CPT | Performed by: NURSE PRACTITIONER

## 2019-08-01 RX ORDER — GABAPENTIN 100 MG/1
100 CAPSULE ORAL NIGHTLY
Qty: 90 CAPSULE | Refills: 3 | Status: ON HOLD | OUTPATIENT
Start: 2019-08-01 | End: 2020-01-31

## 2019-08-01 NOTE — PROGRESS NOTES
Palliative Medicine Outpatient Visit  Provider: Missy NAGY-CNP  19    Referring Provider: Dr. Sneha Lora    Reason for Consult:  [] [address]  [] Assist with goals of care  [] Transition of care  [x] Symptom Management    See below for recommendations, as indicated, concernin. Advanced Care Planning  2. Anticipatory Guidance  3. Transition of Care  4. 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 CXR PA/Lateral was negative for metastatic disease. Patient's age group precluded her from having invasive aggressive surgical procedure at that time. Oncology decided to challenge her cancer first with concurrent chemoRT with Cetuximab and NOT Cisplatin. Cetuximab is much more tolerated than platinum compounds and has been studied in combination with RT in patients who are intolerant to platinum. Cycle # 1 of weekly Cetuximab was on 2015. RT was started on 2015. Dillan Isabel Cycle # 6 of weekly Cetuximab was on 2015. RT was completed on 2015. Oral cavity SCC responded significantly (complete response). Saw Dr. Reza Calixto 2015, reports normal exam.  Clinically Mrs. Tio Cr had near complete local response of right submandibular mass and associated lymphadenopathy with concurrent chemoRT with Dr Sneha Lora and Dr Lucille Cervantes . However, review of recent scans reveal progression of adenopathy in the mediastinum as well as the right axilla. Oncology had a long discussion with Mrs. Tio Cr and her daughter. In view of her advanced age, fragile state, and co-morbidities, she is not a candidate for additional chemotherapy. Therefore, we are recommending conservative approach and referral to Palliative Medicine for symptom managment.     Epic reveals that the patient had been on Norco 5-325 mg earlier in  lastly in appreciated, PMI WNL  Abd.: non-distended, soft, nontender, non-distended, normal bowel sounds  Ext.: no clubbing, cyanosis or edema, no joint tenderness  Skin: warm, adequate hydration without acute lesions but hypopigmented areas to her lower extremities without sign of infection  Neuro: awake, alert, oriented x 3, conversive,     [place] Symptom Assessment Score   [place] Score 8/1/2019 2/7/2019 8/2/2018 7/20/2017 4/20/2017   Pain Score 5 5 No pain 3 No pain   Tiredness Score 7 4 Not tired 4 Not tired   Nausea Score Not nauseated - Not nauseated 3 Not nauseated   Depression Score 4 Not depressed Not depressed Not depressed Not depressed   Anxiety Score 4 Not anxious Not anxious Not anxious Not anxious   Drowsiness Score 7 Not drowsy 5 2 Not drowsy   Appetite Score Worst possible appetite 3 3 4 Best appetite   Wellbeing Score 5 1 3 2 Best feeling of wellbeing   Dyspnea Score 7 No shortness of breath 1 1 No shortness of breath   Other Problem Score 6 6 Best possible response 2 Best possible response   Total Assessment Score(calculated) 55 19 12 21 0     Impression:  As per Dr Coral Rutherford on 12/08/15:  81 y/o -American female with 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 CXR PA/Lateral was negative for metastatic disease. Patient's age group precluded her from having invasive aggressive surgical procedure at that time. Oncology decided to challenge her cancer first with concurrent chemoRT with Cetuximab and NOT Cisplatin. Cetuximab is much more tolerated than platinum compounds and has been studied in combination with RT in patients who are intolerant to platinum. Cycle # 1 of weekly Cetuximab was on 06/29/2015. RT was started on 07/01/2015. Moo Blancas Cycle # 6 of weekly Cetuximab was on 08/04/2015. RT was completed on 08/05/2015. Oral cavity SCC responded significantly (complete response).    Saw Dr. Shayna Choi November 12, 2015, reports normal exam.  Clinically  Graceann Jeans had near complete local response of right submandibular mass and associated lymphadenopathy with concurrent chemoRT with Dr Elizabeth Martinez and Dr Martin Aguilar . However, review of recent scans reveal progression of adenopathy in the mediastinum as well as the right axilla. Oncology had a long discussion with Mrs. Graceann Jeans and her daughter. In view of her advanced age, fragile state, and co-morbidities, she is not a candidate for additional chemotherapy. Therefore, we are recommending conservative approach and referral to Palliative Medicine for symptom managment. Epic reveals that the patient had been on Norco 5-325 mg earlier in 2015 lastly in August.  12/15/15:  OARRS report reviewed today and without red flags consistent with the above. Patient signed a pain contract and desires to continue following up with us here. Patient states that her main objective is to obtain appropriately fitting teeth, she went to [address] [place] [address] which referred her to somewhere in [facility] but it was not cost her too much money. Daughter-in-law states that they will now call [facility] dental clinic to see how they can help. Patient had weighed approximately 104 pounds back in August as per their history and now weighs 116 pounds secondary puréed food, boost, soups and really patient eating anything that she can. Patient states that her energy is decreased but slightly better than it was one she was at her lowest weight. Patient sometimes experiences dizziness upon initially standing but this self corrects itself. We discussed orthostatic hypotension. When asked what we can help her with she answered \"get my teeth\". Patient was introduced to the Palliative Medicine department, social work visited and we will continue to monitor her.   Patient will be hospice appropriate in the future and we will discuss this with her but she desires to seek aggressive treatment with the above which includes testing, procedures when shortness of breath. Patient presents ambulatory in no acute distress at her baseline gaining 4 pounds over the past 2 months. Caregiver states that she has regained some of her appetite back. Patient was not able to be fitted for dentures secondary to lack of adequate bone. Patient complains of a dry mouth and occasional cough that they will begin using olive oil as well as oral moisturizer recommended by the pharmacist.  Patient has frequent awakenings at night if she is not busy during the day otherwise sleeps very well she is busy and caregiver pushes her to remain busy during the day with mixed results. Options were discussed and we are not changing anything but we'll see her back in 3 months and monitor her weight, symptoms. They know that they can call at any time if they have questions or need to see us sooner. 9/15/16:  Medical records reviewed and notes per Dr. Yousif Gotti on 8/3/16:  Patient is seen and examined, continues to do well without any signs of recurrence. Recommend the patient continue a diet as tolerated, and she will follow up in 6 months or sooner with any neck swelling, bleeding, difficulty swallowing, speaking, shortness of breath or any other new or worsening symptoms. Patient seen in room accompanied by her caregiver. She is being seen today for Right oropharyngeal squamous carcinoma and symptom management. She presents ambulatory in no acute distress. She denies pain. She admits to some difficulty with swallowing certain textures of foods and requires food to be pureed. We discussed options for increasing her calories and she is willing to continue with Boost.  She was unable to get her dentures secondary to limited bone availability. She has a fair appetite and blames this on not being able to eat what she likes. Her weight is stable. She has noticed continued mouth dryness and some recent soreness. She uses the biotene rinse and mouth moisturizer.   She has evidence of thrush coating her tongue. Prescribed nystatin swish and spit X7 days. Instructed the patient and caregiver that if the thrush is not better in 7 days she may repeat for one more week. Instructed to call if not resolved after that. Prescription provided for Nystatin 500,000 units 5ml swish and spit QID X7 days with 1 refill. She states that sleep is fair. She blames this on daytime napping. Reviewed that if she naps less during the day she may sleep better at night. She states that she has an occasional cough, however, her night time coughing is much improved. Will continue the omeprazole 20mg dly. Prescription provided for omeprazole 20mg dly # 30 with 2 refills. We will see her back in 3 months or sooner if needed. 11/10/16:  Medical records reviewed and patient states couple days ago that she noticed significant right sided facial pain pointing to her right mandibular area preauricular. Patient denies any traumas but states it's difficult to eat because of this. Caregiver states that she was slumped over in her bed yesterday morning as she was trying to get up and she knew something was going on at that time. Patient did not want to go to the emergency department but came to see us this morning. Patient obviously uncomfortable, alert with no sign of confusion or sedation but not at her baseline in discomfort. Options were discussed and I felt it was best to obtain blood work, x-rays of the area looking for an occult fracture, infection by presenting to the emergency department where they could also help her with her immediate pain needs. Patient took an aspirin yesterday and usually is not on anything for discomfort. They voiced desire to do so and we will communicate with the emergency department as well.   12/08/16:  Medical records were reviewed and as per her dentist's assessment on 12/02/16:  Lefty Avelar is a 80y.o. year old female, here today for follow up of history of oral mucosa comfortable especially with her recent admission to follow-up with us 3 months from now and they know that they can call at any time. At this time she will need a prescription for her Neurontin.  07/20/17:  Medical records reviewed and as per otolaryngology assessment on 06/07/17:  Clover Padilla is a 80y.o. 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 Patient has been treated medically, responded well. She was recently hospitalized for a UTI. She denies difficulty swallowing, throat pain, ear pain, or change in voice. She has occasional twinges of soreness in the parotid area. Patient did have an acute parotitis secondary to radiation changes but has responded well to medical therapy. Will give a course of keflex for 7 days for parotitis. If She experiences any symptoms she is to stop the medication. Continue 6 month surveillance. Encouraged to stay very well hydrated. Patient presents with her caregiver in no acute distress at her baseline without sign of sedation and/or confusion able to voice her needs. Patient doing very well with a slight weight increase after her admission to an ECF for a couple of weeks now back home for proximally month. Caregiver states that she goes to bed around 7 or 8:00 at night and sometimes does not desire to get up until 8:30 or 9 the next morning and feels that she does not sleep that full-time. Patient has been doing well without complaints other than fatigue. Options were discussed and we are restarting her Neurontin 100 mg q.h.s. that she has been off of over the past month and hopefully this will help her get back into more of a rhythm as far as her sleep goes and help with any neuropathic pain from her previous treatments. We will see her back in 6 months or sooner if she needs us.   08/02/18:  Medical records reviewed and patient presents looking slightly more spry than she has in the past last year able to voice her needs ambulating well with the help of a cane. Patient's primary complaint is not being able to sleep as well. Upon further questioning patient stopped her gabapentin 100 mg q.h.s. for some unknown reason. Caregiver states that maybe it was because they were giving antibiotics at that time and she just did not restart it. Patient has gained 3 pounds and otherwise doing well. Options were discussed and we are restarting gabapentin 100 mg q.h.s. as per Epic and receiving her back 6 months from now. They know to call if she has any problems or concerns. 02/07/19:  Medical records reviewed and as per otolaryngology on 12/10/18:  Patient was a T3 N1 AM acts squamous cell carcinoma of the right retromolar trigone status post chemoradiation over 3 year sarabjit without recurrence. Continue close follow-up every 6 months, or sooner with any noted changes of difficulty swallowing neck mass or areas that are bleeding. Advised baby oil or mineral oil can be used to help with itching of the ears. Patient presents today in a wheelchair accompanied by her caregiver at her baseline able to voice her needs the way Kellie does. Caregiver states that occasionally it looks like she has pain whether it's holding her face or moaning. Patient also has occasional constipation otherwise no new symptoms. Options were discussed and the caregivers going to try to give Kellie 2 of the 100 mg capsules of the gabapentin at night being aware of sedation potential and let us know how this goes next week. If sedation occurs to stop this immediately. If she tolerates 200 mg gabapentin q.h.s. we will prescribe appropriately if not we will continue gabapentin 100 mg q.h.s. They will also try prune or apple juice with the melted tablespoon of butter mixed in to help with constipation as well as the stool softener.   She will let us know how this

## 2019-08-02 ENCOUNTER — HOSPITAL ENCOUNTER (EMERGENCY)
Age: 84
Discharge: HOME | End: 2019-08-02
Attending: EMERGENCY MEDICINE
Payer: COMMERCIAL

## 2019-08-02 VITALS
TEMPERATURE: 98.1 F | RESPIRATION RATE: 20 BRPM | DIASTOLIC BLOOD PRESSURE: 58 MMHG | HEART RATE: 80 BPM | SYSTOLIC BLOOD PRESSURE: 134 MMHG | OXYGEN SATURATION: 94 %

## 2019-08-02 DIAGNOSIS — B37.49 CANDIDA INFECTION OF GENITAL REGION: Primary | ICD-10-CM

## 2019-08-02 DIAGNOSIS — N30.00 ACUTE CYSTITIS WITHOUT HEMATURIA: ICD-10-CM

## 2019-08-02 LAB
BACTERIA: ABNORMAL /HPF
BILIRUBIN URINE: NEGATIVE
BLOOD, URINE: NEGATIVE
CLARITY: ABNORMAL
COLOR: YELLOW
EPITHELIAL CELLS, UA: ABNORMAL /HPF
GLUCOSE URINE: NEGATIVE MG/DL
KETONES, URINE: NEGATIVE MG/DL
LEUKOCYTE ESTERASE, URINE: NEGATIVE
NITRITE, URINE: POSITIVE
PH UA: 6 (ref 5–9)
PROTEIN UA: NEGATIVE MG/DL
RBC UA: ABNORMAL /HPF (ref 0–2)
SPECIFIC GRAVITY UA: 1.02 (ref 1–1.03)
UROBILINOGEN, URINE: 0.2 E.U./DL
WBC UA: ABNORMAL /HPF (ref 0–5)

## 2019-08-02 PROCEDURE — 99283 EMERGENCY DEPT VISIT LOW MDM: CPT

## 2019-08-02 PROCEDURE — 81001 URINALYSIS AUTO W/SCOPE: CPT

## 2019-08-02 PROCEDURE — 87186 SC STD MICRODIL/AGAR DIL: CPT

## 2019-08-02 PROCEDURE — 2500000003 HC RX 250 WO HCPCS: Performed by: EMERGENCY MEDICINE

## 2019-08-02 PROCEDURE — 6370000000 HC RX 637 (ALT 250 FOR IP): Performed by: EMERGENCY MEDICINE

## 2019-08-02 PROCEDURE — 87088 URINE BACTERIA CULTURE: CPT

## 2019-08-02 RX ORDER — CEPHALEXIN 250 MG/1
500 CAPSULE ORAL 3 TIMES DAILY
Qty: 30 CAPSULE | Refills: 0 | Status: SHIPPED | OUTPATIENT
Start: 2019-08-02 | End: 2019-08-07

## 2019-08-02 RX ORDER — GRANULES FOR ORAL 3 G/1
3 POWDER ORAL ONCE
Status: DISCONTINUED | OUTPATIENT
Start: 2019-08-02 | End: 2019-08-02 | Stop reason: HOSPADM

## 2019-08-02 RX ADMIN — ANTI-FUNGAL POWDER MICONAZOLE NITRATE TALC FREE: 1.42 POWDER TOPICAL at 17:11

## 2019-08-02 ASSESSMENT — ENCOUNTER SYMPTOMS
DIARRHEA: 0
SINUS PAIN: 0
SORE THROAT: 0
PHOTOPHOBIA: 0
COUGH: 0
RHINORRHEA: 0
WHEEZING: 0
SINUS PRESSURE: 0
SHORTNESS OF BREATH: 0
ABDOMINAL PAIN: 0
NAUSEA: 0
VOMITING: 0
BACK PAIN: 0
CONSTIPATION: 0

## 2019-08-02 NOTE — ED PROVIDER NOTES
Patient is a 8-year-old female who presents the chief complaints of rash in her inguinal folds. She is accompanied by her daughter who provides most the history. She states that she is not sure how long the rash has been there, but when she was getting her mother dressed this morning she noticed the rash. She tried putting Desitin cream on it. The patient does complain that it hurts. She kept pointing to her perineal region saying that it hurt. She denies any fevers, chills, dysuria, or hematuria. The history is provided by the patient and a relative. No  was used. Review of Systems   Constitutional: Negative for chills, fatigue and fever. HENT: Negative for congestion, rhinorrhea, sinus pressure, sinus pain, sneezing and sore throat. Eyes: Negative for photophobia and visual disturbance. Respiratory: Negative for cough, shortness of breath and wheezing. Cardiovascular: Negative for chest pain and palpitations. Gastrointestinal: Negative for abdominal pain, constipation, diarrhea, nausea and vomiting. Genitourinary: Negative for dysuria, frequency and hematuria. Musculoskeletal: Negative for back pain, neck pain and neck stiffness. Skin: Positive for rash. Negative for wound. Neurological: Negative for dizziness, light-headedness and headaches. Psychiatric/Behavioral: Negative for agitation, behavioral problems and confusion. Physical Exam   Constitutional: She is oriented to person, place, and time. She appears well-developed and well-nourished. No distress. HENT:   Head: Normocephalic and atraumatic. Eyes: Conjunctivae are normal. Right eye exhibits no discharge. Left eye exhibits no discharge. No scleral icterus. Neck: Normal range of motion. Neck supple. Cardiovascular: Normal rate and regular rhythm. No murmur heard. Pulmonary/Chest: Effort normal and breath sounds normal. No stridor. No respiratory distress. Abdominal: Soft.  Bowel

## 2019-08-04 LAB
ORGANISM: ABNORMAL
URINE CULTURE, ROUTINE: ABNORMAL
URINE CULTURE, ROUTINE: ABNORMAL

## 2019-08-12 ENCOUNTER — OFFICE VISIT (OUTPATIENT)
Dept: ENT CLINIC | Age: 84
End: 2019-08-12
Payer: COMMERCIAL

## 2019-08-12 ENCOUNTER — PROCEDURE VISIT (OUTPATIENT)
Dept: AUDIOLOGY | Age: 84
End: 2019-08-12
Payer: COMMERCIAL

## 2019-08-12 VITALS
BODY MASS INDEX: 23.39 KG/M2 | HEIGHT: 59 IN | DIASTOLIC BLOOD PRESSURE: 66 MMHG | WEIGHT: 116 LBS | HEART RATE: 73 BPM | SYSTOLIC BLOOD PRESSURE: 132 MMHG

## 2019-08-12 DIAGNOSIS — H65.90 FLUID COLLECTION OF MIDDLE EAR: ICD-10-CM

## 2019-08-12 DIAGNOSIS — H69.82 DYSFUNCTION OF LEFT EUSTACHIAN TUBE: ICD-10-CM

## 2019-08-12 DIAGNOSIS — H66.91 RIGHT OTITIS MEDIA, UNSPECIFIED OTITIS MEDIA TYPE: Primary | ICD-10-CM

## 2019-08-12 DIAGNOSIS — H90.5 SENSORINEURAL HEARING LOSS (SNHL) OF LEFT EAR, UNSPECIFIED HEARING STATUS ON CONTRALATERAL SIDE: ICD-10-CM

## 2019-08-12 PROCEDURE — 1123F ACP DISCUSS/DSCN MKR DOCD: CPT | Performed by: OTOLARYNGOLOGY

## 2019-08-12 PROCEDURE — G8427 DOCREV CUR MEDS BY ELIG CLIN: HCPCS | Performed by: OTOLARYNGOLOGY

## 2019-08-12 PROCEDURE — 92567 TYMPANOMETRY: CPT | Performed by: AUDIOLOGIST

## 2019-08-12 PROCEDURE — 99213 OFFICE O/P EST LOW 20 MIN: CPT | Performed by: OTOLARYNGOLOGY

## 2019-08-12 PROCEDURE — 92557 COMPREHENSIVE HEARING TEST: CPT | Performed by: AUDIOLOGIST

## 2019-08-12 PROCEDURE — 4040F PNEUMOC VAC/ADMIN/RCVD: CPT | Performed by: OTOLARYNGOLOGY

## 2019-08-12 PROCEDURE — G8420 CALC BMI NORM PARAMETERS: HCPCS | Performed by: OTOLARYNGOLOGY

## 2019-08-12 PROCEDURE — 1036F TOBACCO NON-USER: CPT | Performed by: OTOLARYNGOLOGY

## 2019-08-12 PROCEDURE — 1090F PRES/ABSN URINE INCON ASSESS: CPT | Performed by: OTOLARYNGOLOGY

## 2019-08-12 ASSESSMENT — ENCOUNTER SYMPTOMS
SORE THROAT: 0
COUGH: 0
COLOR CHANGE: 0
EYE REDNESS: 0
RHINORRHEA: 0
SINUS PRESSURE: 0
EYE PAIN: 0

## 2019-08-14 NOTE — PROGRESS NOTES
This patient was referred for audiometric/tympanometric testing by Dr. Sid Hoew due to a bilateral decrease in hearing sensitivity, that is worse in the right ear, per patient report. Patient denied tinnitus and ear pain, bilaterally. Audiometry revealed a moderately-severe-to-severe hearing loss, through the frequency range, bilaterally (right ear worse). Masked bone conduction testing was attempted, but patient could not condition to the test procedure. Reliability was fair. Speech reception thresholds were in good agreement with the pure tone averages, bilaterally. Speech discrimination scores were 60%, bilaterally at [address]. Tympanometry revealed a flat tympanogram, with no middle ear peak pressure, right ear and a negative middle ear peak pressure, with normal compliance, left ear. Ipsilateral acoustic reflexes were absent, bilaterally at 1000Hz. The results were reviewed with the patient. Recommendations for follow up will be made pending physician consult.     Deni Traylor, [facility]

## 2019-10-14 ENCOUNTER — TELEPHONE (OUTPATIENT)
Dept: ADMINISTRATIVE | Age: 84
End: 2019-10-14

## 2019-10-21 ENCOUNTER — OFFICE VISIT (OUTPATIENT)
Dept: ENT CLINIC | Age: 84
End: 2019-10-21
Payer: COMMERCIAL

## 2019-10-21 VITALS
SYSTOLIC BLOOD PRESSURE: 130 MMHG | BODY MASS INDEX: 24.96 KG/M2 | WEIGHT: 123.8 LBS | DIASTOLIC BLOOD PRESSURE: 60 MMHG | HEART RATE: 84 BPM | RESPIRATION RATE: 24 BRPM | HEIGHT: 59 IN

## 2019-10-21 DIAGNOSIS — C06.9 SQUAMOUS CELL CARCINOMA OF ORAL CAVITY (HCC): ICD-10-CM

## 2019-10-21 DIAGNOSIS — H90.5 SENSORINEURAL HEARING LOSS (SNHL) OF LEFT EAR, UNSPECIFIED HEARING STATUS ON CONTRALATERAL SIDE: Primary | ICD-10-CM

## 2019-10-21 PROCEDURE — G8417 CALC BMI ABV UP PARAM F/U: HCPCS | Performed by: OTOLARYNGOLOGY

## 2019-10-21 PROCEDURE — 99213 OFFICE O/P EST LOW 20 MIN: CPT | Performed by: OTOLARYNGOLOGY

## 2019-10-21 PROCEDURE — 4040F PNEUMOC VAC/ADMIN/RCVD: CPT | Performed by: OTOLARYNGOLOGY

## 2019-10-21 PROCEDURE — 1090F PRES/ABSN URINE INCON ASSESS: CPT | Performed by: OTOLARYNGOLOGY

## 2019-10-21 PROCEDURE — G8484 FLU IMMUNIZE NO ADMIN: HCPCS | Performed by: OTOLARYNGOLOGY

## 2019-10-21 PROCEDURE — 1123F ACP DISCUSS/DSCN MKR DOCD: CPT | Performed by: OTOLARYNGOLOGY

## 2019-10-21 PROCEDURE — 1036F TOBACCO NON-USER: CPT | Performed by: OTOLARYNGOLOGY

## 2019-10-21 PROCEDURE — G8427 DOCREV CUR MEDS BY ELIG CLIN: HCPCS | Performed by: OTOLARYNGOLOGY

## 2019-10-21 ASSESSMENT — ENCOUNTER SYMPTOMS
SORE THROAT: 0
TROUBLE SWALLOWING: 0
VOICE CHANGE: 0
EYES NEGATIVE: 1
RESPIRATORY NEGATIVE: 1

## 2019-11-22 ENCOUNTER — APPOINTMENT (OUTPATIENT)
Dept: GENERAL RADIOLOGY | Age: 84
End: 2019-11-22
Payer: COMMERCIAL

## 2019-11-22 ENCOUNTER — HOSPITAL ENCOUNTER (EMERGENCY)
Age: 84
Discharge: HOME | End: 2019-11-23
Attending: EMERGENCY MEDICINE
Payer: COMMERCIAL

## 2019-11-22 DIAGNOSIS — J18.9 COMMUNITY ACQUIRED PNEUMONIA OF RIGHT MIDDLE LOBE OF LUNG: Primary | ICD-10-CM

## 2019-11-22 LAB
HCT VFR BLD CALC: 38.1 % (ref 34–48)
HEMOGLOBIN: 11.6 G/DL (ref 11.5–15.5)
MCH RBC QN AUTO: 32.3 PG (ref 26–35)
MCHC RBC AUTO-ENTMCNC: 30.4 % (ref 32–34.5)
MCV RBC AUTO: 106.1 FL (ref 80–99.9)
PDW BLD-RTO: 12.7 FL (ref 11.5–15)
PLATELET # BLD: 178 E9/L (ref 130–450)
PMV BLD AUTO: 10.6 FL (ref 7–12)
RBC # BLD: 3.59 E12/L (ref 3.5–5.5)
REASON FOR REJECTION: NORMAL
REJECTED TEST: NORMAL
WBC # BLD: 7.7 E9/L (ref 4.5–11.5)

## 2019-11-22 PROCEDURE — 6370000000 HC RX 637 (ALT 250 FOR IP): Performed by: STUDENT IN AN ORGANIZED HEALTH CARE EDUCATION/TRAINING PROGRAM

## 2019-11-22 PROCEDURE — 6360000002 HC RX W HCPCS: Performed by: STUDENT IN AN ORGANIZED HEALTH CARE EDUCATION/TRAINING PROGRAM

## 2019-11-22 PROCEDURE — 71046 X-RAY EXAM CHEST 2 VIEWS: CPT

## 2019-11-22 PROCEDURE — 96374 THER/PROPH/DIAG INJ IV PUSH: CPT

## 2019-11-22 PROCEDURE — 2580000003 HC RX 258: Performed by: STUDENT IN AN ORGANIZED HEALTH CARE EDUCATION/TRAINING PROGRAM

## 2019-11-22 PROCEDURE — 85027 COMPLETE CBC AUTOMATED: CPT

## 2019-11-22 PROCEDURE — 99284 EMERGENCY DEPT VISIT MOD MDM: CPT

## 2019-11-22 PROCEDURE — 36415 COLL VENOUS BLD VENIPUNCTURE: CPT

## 2019-11-22 RX ORDER — 0.9 % SODIUM CHLORIDE 0.9 %
500 INTRAVENOUS SOLUTION INTRAVENOUS ONCE
Status: COMPLETED | OUTPATIENT
Start: 2019-11-22 | End: 2019-11-23

## 2019-11-22 RX ORDER — METHYLPREDNISOLONE SODIUM SUCCINATE 40 MG/ML
40 INJECTION, POWDER, LYOPHILIZED, FOR SOLUTION INTRAMUSCULAR; INTRAVENOUS ONCE
Status: COMPLETED | OUTPATIENT
Start: 2019-11-22 | End: 2019-11-22

## 2019-11-22 RX ORDER — METHYLPREDNISOLONE SODIUM SUCCINATE 40 MG/ML
40 INJECTION, POWDER, LYOPHILIZED, FOR SOLUTION INTRAMUSCULAR; INTRAVENOUS DAILY
Status: DISCONTINUED | OUTPATIENT
Start: 2019-11-23 | End: 2019-11-22

## 2019-11-22 RX ORDER — IPRATROPIUM BROMIDE AND ALBUTEROL SULFATE 2.5; .5 MG/3ML; MG/3ML
1 SOLUTION RESPIRATORY (INHALATION) ONCE
Status: COMPLETED | OUTPATIENT
Start: 2019-11-22 | End: 2019-11-22

## 2019-11-22 RX ORDER — IPRATROPIUM BROMIDE AND ALBUTEROL SULFATE 2.5; .5 MG/3ML; MG/3ML
1 SOLUTION RESPIRATORY (INHALATION)
Status: DISCONTINUED | OUTPATIENT
Start: 2019-11-23 | End: 2019-11-22

## 2019-11-22 RX ORDER — ACETAMINOPHEN 325 MG/1
650 TABLET ORAL ONCE
Status: COMPLETED | OUTPATIENT
Start: 2019-11-22 | End: 2019-11-22

## 2019-11-22 RX ADMIN — METHYLPREDNISOLONE SODIUM SUCCINATE 40 MG: 40 INJECTION, POWDER, FOR SOLUTION INTRAMUSCULAR; INTRAVENOUS at 23:22

## 2019-11-22 RX ADMIN — IPRATROPIUM BROMIDE AND ALBUTEROL SULFATE 1 AMPULE: .5; 3 SOLUTION RESPIRATORY (INHALATION) at 23:23

## 2019-11-22 RX ADMIN — ACETAMINOPHEN 650 MG: 325 TABLET ORAL at 23:21

## 2019-11-22 RX ADMIN — SODIUM CHLORIDE 500 ML: 9 INJECTION, SOLUTION INTRAVENOUS at 22:56

## 2019-11-22 ASSESSMENT — ENCOUNTER SYMPTOMS
CHEST TIGHTNESS: 0
STRIDOR: 0
SHORTNESS OF BREATH: 0
CONSTIPATION: 0
CHOKING: 0
GASTROINTESTINAL NEGATIVE: 1
WHEEZING: 0
COUGH: 1
COLOR CHANGE: 0
ABDOMINAL DISTENTION: 0
TROUBLE SWALLOWING: 0
PHOTOPHOBIA: 0
VOICE CHANGE: 0
EYES NEGATIVE: 1
VOMITING: 0
DIARRHEA: 0
ALLERGIC/IMMUNOLOGIC NEGATIVE: 1

## 2019-11-22 ASSESSMENT — PAIN SCALES - GENERAL: PAINLEVEL_OUTOF10: 0

## 2019-11-23 VITALS
OXYGEN SATURATION: 94 % | DIASTOLIC BLOOD PRESSURE: 53 MMHG | HEART RATE: 83 BPM | BODY MASS INDEX: 24.24 KG/M2 | SYSTOLIC BLOOD PRESSURE: 121 MMHG | WEIGHT: 120 LBS | TEMPERATURE: 97.9 F | RESPIRATION RATE: 18 BRPM

## 2019-11-23 LAB
ANION GAP SERPL CALCULATED.3IONS-SCNC: 12 MMOL/L (ref 7–16)
BUN BLDV-MCNC: 29 MG/DL (ref 8–23)
CALCIUM SERPL-MCNC: 9.2 MG/DL (ref 8.6–10.2)
CHLORIDE BLD-SCNC: 105 MMOL/L (ref 98–107)
CO2: 25 MMOL/L (ref 22–29)
CREAT SERPL-MCNC: 1.6 MG/DL (ref 0.5–1)
GFR AFRICAN AMERICAN: 36
GFR NON-AFRICAN AMERICAN: 36 ML/MIN/1.73
GLUCOSE BLD-MCNC: 125 MG/DL (ref 74–99)
POTASSIUM REFLEX MAGNESIUM: 4.2 MMOL/L (ref 3.5–5)
SODIUM BLD-SCNC: 142 MMOL/L (ref 132–146)

## 2019-11-23 PROCEDURE — 36415 COLL VENOUS BLD VENIPUNCTURE: CPT

## 2019-11-23 PROCEDURE — 80048 BASIC METABOLIC PNL TOTAL CA: CPT

## 2019-11-23 PROCEDURE — 94640 AIRWAY INHALATION TREATMENT: CPT

## 2019-11-23 RX ORDER — DOXYCYCLINE HYCLATE 100 MG
100 TABLET ORAL 2 TIMES DAILY
Qty: 14 TABLET | Refills: 0 | Status: SHIPPED | OUTPATIENT
Start: 2019-11-23 | End: 2019-11-30

## 2020-01-30 ENCOUNTER — OFFICE VISIT (OUTPATIENT)
Dept: PALLATIVE CARE | Age: 85
DRG: 884 | End: 2020-01-30
Payer: COMMERCIAL

## 2020-01-30 VITALS
HEART RATE: 103 BPM | BODY MASS INDEX: 24.44 KG/M2 | SYSTOLIC BLOOD PRESSURE: 142 MMHG | WEIGHT: 121 LBS | TEMPERATURE: 99.9 F | DIASTOLIC BLOOD PRESSURE: 66 MMHG

## 2020-01-30 PROCEDURE — 99213 OFFICE O/P EST LOW 20 MIN: CPT | Performed by: NURSE PRACTITIONER

## 2020-01-30 PROCEDURE — G8484 FLU IMMUNIZE NO ADMIN: HCPCS | Performed by: NURSE PRACTITIONER

## 2020-01-30 PROCEDURE — 1090F PRES/ABSN URINE INCON ASSESS: CPT | Performed by: NURSE PRACTITIONER

## 2020-01-30 PROCEDURE — G8428 CUR MEDS NOT DOCUMENT: HCPCS | Performed by: NURSE PRACTITIONER

## 2020-01-30 PROCEDURE — G8420 CALC BMI NORM PARAMETERS: HCPCS | Performed by: NURSE PRACTITIONER

## 2020-01-30 PROCEDURE — 1123F ACP DISCUSS/DSCN MKR DOCD: CPT | Performed by: NURSE PRACTITIONER

## 2020-01-30 PROCEDURE — 4040F PNEUMOC VAC/ADMIN/RCVD: CPT | Performed by: NURSE PRACTITIONER

## 2020-01-30 PROCEDURE — 1036F TOBACCO NON-USER: CPT | Performed by: NURSE PRACTITIONER

## 2020-01-30 PROCEDURE — 99212 OFFICE O/P EST SF 10 MIN: CPT | Performed by: NURSE PRACTITIONER

## 2020-01-30 RX ORDER — ACETAMINOPHEN 160 MG/5ML
650 SOLUTION ORAL EVERY 6 HOURS PRN
Qty: 1200 ML | Refills: 1 | Status: ON HOLD | OUTPATIENT
Start: 2020-01-30 | End: 2020-01-31

## 2020-01-31 ENCOUNTER — HOSPITAL ENCOUNTER (INPATIENT)
Age: 85
LOS: 5 days | Discharge: NURSING, REHAB OR LONG-TERM CARE | DRG: 884 | End: 2020-02-05
Attending: EMERGENCY MEDICINE | Admitting: INTERNAL MEDICINE
Payer: COMMERCIAL

## 2020-01-31 ENCOUNTER — APPOINTMENT (OUTPATIENT)
Dept: GENERAL RADIOLOGY | Age: 85
DRG: 884 | End: 2020-01-31
Payer: COMMERCIAL

## 2020-01-31 PROBLEM — R53.1 GENERALIZED WEAKNESS: Status: ACTIVE | Noted: 2020-01-31

## 2020-01-31 LAB
ALBUMIN SERPL-MCNC: 2.8 G/DL (ref 3.5–5.2)
ALP BLD-CCNC: 267 U/L (ref 35–104)
ALT SERPL-CCNC: 19 U/L (ref 0–32)
AMORPHOUS: NORMAL
ANION GAP SERPL CALCULATED.3IONS-SCNC: 11 MMOL/L (ref 7–16)
AST SERPL-CCNC: 34 U/L (ref 0–31)
BACTERIA: NORMAL /HPF
BASOPHILS ABSOLUTE: 0.02 E9/L (ref 0–0.2)
BASOPHILS RELATIVE PERCENT: 0.3 % (ref 0–2)
BILIRUB SERPL-MCNC: 0.2 MG/DL (ref 0–1.2)
BILIRUBIN URINE: NEGATIVE
BLOOD, URINE: NEGATIVE
BUN BLDV-MCNC: 28 MG/DL (ref 8–23)
CALCIUM SERPL-MCNC: 8.8 MG/DL (ref 8.6–10.2)
CASTS: NORMAL /LPF
CHLORIDE BLD-SCNC: 106 MMOL/L (ref 98–107)
CLARITY: CLEAR
CO2: 25 MMOL/L (ref 22–29)
COLOR: YELLOW
CREAT SERPL-MCNC: 1.6 MG/DL (ref 0.5–1)
EOSINOPHILS ABSOLUTE: 0.1 E9/L (ref 0.05–0.5)
EOSINOPHILS RELATIVE PERCENT: 1.5 % (ref 0–6)
EPITHELIAL CELLS, UA: NORMAL /HPF
GFR AFRICAN AMERICAN: 36
GFR NON-AFRICAN AMERICAN: 36 ML/MIN/1.73
GLUCOSE BLD-MCNC: 128 MG/DL (ref 74–99)
GLUCOSE URINE: NEGATIVE MG/DL
HCT VFR BLD CALC: 36.7 % (ref 34–48)
HEMOGLOBIN: 11.3 G/DL (ref 11.5–15.5)
IMMATURE GRANULOCYTES #: 0.02 E9/L
IMMATURE GRANULOCYTES %: 0.3 % (ref 0–5)
KETONES, URINE: NEGATIVE MG/DL
LACTIC ACID: 1.9 MMOL/L (ref 0.5–2.2)
LEUKOCYTE ESTERASE, URINE: NEGATIVE
LYMPHOCYTES ABSOLUTE: 1.87 E9/L (ref 1.5–4)
LYMPHOCYTES RELATIVE PERCENT: 28.3 % (ref 20–42)
MCH RBC QN AUTO: 32.2 PG (ref 26–35)
MCHC RBC AUTO-ENTMCNC: 30.8 % (ref 32–34.5)
MCV RBC AUTO: 104.6 FL (ref 80–99.9)
MONOCYTES ABSOLUTE: 0.49 E9/L (ref 0.1–0.95)
MONOCYTES RELATIVE PERCENT: 7.4 % (ref 2–12)
NEUTROPHILS ABSOLUTE: 4.11 E9/L (ref 1.8–7.3)
NEUTROPHILS RELATIVE PERCENT: 62.2 % (ref 43–80)
NITRITE, URINE: NEGATIVE
PDW BLD-RTO: 12.8 FL (ref 11.5–15)
PH UA: 5 (ref 5–9)
PLATELET # BLD: 276 E9/L (ref 130–450)
PMV BLD AUTO: 10.7 FL (ref 7–12)
POTASSIUM SERPL-SCNC: 3.9 MMOL/L (ref 3.5–5)
PROTEIN UA: NORMAL MG/DL
RBC # BLD: 3.51 E12/L (ref 3.5–5.5)
RBC UA: NORMAL /HPF (ref 0–2)
REASON FOR REJECTION: NORMAL
REJECTED TEST: NORMAL
SODIUM BLD-SCNC: 142 MMOL/L (ref 132–146)
SPECIFIC GRAVITY UA: 1.02 (ref 1–1.03)
TOTAL PROTEIN: 7.4 G/DL (ref 6.4–8.3)
UROBILINOGEN, URINE: 0.2 E.U./DL
WBC # BLD: 6.6 E9/L (ref 4.5–11.5)
WBC UA: NORMAL /HPF (ref 0–5)

## 2020-01-31 PROCEDURE — 6370000000 HC RX 637 (ALT 250 FOR IP): Performed by: EMERGENCY MEDICINE

## 2020-01-31 PROCEDURE — 2580000003 HC RX 258: Performed by: EMERGENCY MEDICINE

## 2020-01-31 PROCEDURE — 36415 COLL VENOUS BLD VENIPUNCTURE: CPT

## 2020-01-31 PROCEDURE — 71045 X-RAY EXAM CHEST 1 VIEW: CPT

## 2020-01-31 PROCEDURE — 83605 ASSAY OF LACTIC ACID: CPT

## 2020-01-31 PROCEDURE — 99222 1ST HOSP IP/OBS MODERATE 55: CPT | Performed by: INTERNAL MEDICINE

## 2020-01-31 PROCEDURE — 87502 INFLUENZA DNA AMP PROBE: CPT

## 2020-01-31 PROCEDURE — 87040 BLOOD CULTURE FOR BACTERIA: CPT

## 2020-01-31 PROCEDURE — 82550 ASSAY OF CK (CPK): CPT

## 2020-01-31 PROCEDURE — 87088 URINE BACTERIA CULTURE: CPT

## 2020-01-31 PROCEDURE — 1200000000 HC SEMI PRIVATE

## 2020-01-31 PROCEDURE — 99285 EMERGENCY DEPT VISIT HI MDM: CPT

## 2020-01-31 PROCEDURE — 93005 ELECTROCARDIOGRAM TRACING: CPT | Performed by: EMERGENCY MEDICINE

## 2020-01-31 PROCEDURE — 81001 URINALYSIS AUTO W/SCOPE: CPT

## 2020-01-31 PROCEDURE — 80053 COMPREHEN METABOLIC PANEL: CPT

## 2020-01-31 PROCEDURE — 84443 ASSAY THYROID STIM HORMONE: CPT

## 2020-01-31 PROCEDURE — 85025 COMPLETE CBC W/AUTO DIFF WBC: CPT

## 2020-01-31 RX ORDER — ONDANSETRON 2 MG/ML
4 INJECTION INTRAMUSCULAR; INTRAVENOUS EVERY 6 HOURS PRN
Status: DISCONTINUED | OUTPATIENT
Start: 2020-01-31 | End: 2020-02-05 | Stop reason: HOSPADM

## 2020-01-31 RX ORDER — GABAPENTIN 100 MG/1
100 CAPSULE ORAL NIGHTLY
Status: DISCONTINUED | OUTPATIENT
Start: 2020-01-31 | End: 2020-01-31

## 2020-01-31 RX ORDER — POTASSIUM CHLORIDE 7.45 MG/ML
10 INJECTION INTRAVENOUS PRN
Status: DISCONTINUED | OUTPATIENT
Start: 2020-01-31 | End: 2020-02-05 | Stop reason: HOSPADM

## 2020-01-31 RX ORDER — ACETAMINOPHEN 325 MG/1
650 TABLET ORAL EVERY 4 HOURS PRN
Status: DISCONTINUED | OUTPATIENT
Start: 2020-01-31 | End: 2020-02-05 | Stop reason: HOSPADM

## 2020-01-31 RX ORDER — ACETAMINOPHEN 160 MG/5ML
650 SUSPENSION, ORAL (FINAL DOSE FORM) ORAL ONCE
Status: COMPLETED | OUTPATIENT
Start: 2020-01-31 | End: 2020-01-31

## 2020-01-31 RX ORDER — ACETAMINOPHEN 325 MG/1
650 TABLET ORAL ONCE
Status: DISCONTINUED | OUTPATIENT
Start: 2020-01-31 | End: 2020-01-31

## 2020-01-31 RX ORDER — POTASSIUM CHLORIDE 20 MEQ/1
40 TABLET, EXTENDED RELEASE ORAL PRN
Status: DISCONTINUED | OUTPATIENT
Start: 2020-01-31 | End: 2020-02-05 | Stop reason: HOSPADM

## 2020-01-31 RX ORDER — SODIUM CHLORIDE 0.9 % (FLUSH) 0.9 %
10 SYRINGE (ML) INJECTION PRN
Status: DISCONTINUED | OUTPATIENT
Start: 2020-01-31 | End: 2020-02-05 | Stop reason: HOSPADM

## 2020-01-31 RX ORDER — 0.9 % SODIUM CHLORIDE 0.9 %
1000 INTRAVENOUS SOLUTION INTRAVENOUS ONCE
Status: COMPLETED | OUTPATIENT
Start: 2020-01-31 | End: 2020-01-31

## 2020-01-31 RX ORDER — MAGNESIUM SULFATE 1 G/100ML
1 INJECTION INTRAVENOUS PRN
Status: DISCONTINUED | OUTPATIENT
Start: 2020-01-31 | End: 2020-02-05 | Stop reason: HOSPADM

## 2020-01-31 RX ORDER — SODIUM CHLORIDE 0.9 % (FLUSH) 0.9 %
10 SYRINGE (ML) INJECTION EVERY 12 HOURS SCHEDULED
Status: DISCONTINUED | OUTPATIENT
Start: 2020-01-31 | End: 2020-02-05 | Stop reason: HOSPADM

## 2020-01-31 RX ADMIN — ACETAMINOPHEN 650 MG: 160 SUSPENSION ORAL at 19:49

## 2020-01-31 RX ADMIN — SODIUM CHLORIDE 1000 ML: 9 INJECTION, SOLUTION INTRAVENOUS at 19:37

## 2020-02-01 PROBLEM — N18.30 STAGE 3 CHRONIC KIDNEY DISEASE (HCC): Chronic | Status: ACTIVE | Noted: 2020-02-01

## 2020-02-01 PROBLEM — D53.9 MACROCYTIC ANEMIA: Chronic | Status: ACTIVE | Noted: 2020-02-01

## 2020-02-01 LAB
ADENOVIRUS BY PCR: NOT DETECTED
ALBUMIN SERPL-MCNC: 2.6 G/DL (ref 3.5–5.2)
ALP BLD-CCNC: 253 U/L (ref 35–104)
ALT SERPL-CCNC: 17 U/L (ref 0–32)
ANION GAP SERPL CALCULATED.3IONS-SCNC: 11 MMOL/L (ref 7–16)
AST SERPL-CCNC: 32 U/L (ref 0–31)
BASOPHILS ABSOLUTE: 0.03 E9/L (ref 0–0.2)
BASOPHILS RELATIVE PERCENT: 0.5 % (ref 0–2)
BILIRUB SERPL-MCNC: 0.3 MG/DL (ref 0–1.2)
BILIRUBIN DIRECT: <0.2 MG/DL (ref 0–0.3)
BILIRUBIN, INDIRECT: ABNORMAL MG/DL (ref 0–1)
BORDETELLA PARAPERTUSSIS BY PCR: NOT DETECTED
BORDETELLA PERTUSSIS BY PCR: NOT DETECTED
BUN BLDV-MCNC: 26 MG/DL (ref 8–23)
CALCIUM SERPL-MCNC: 8.8 MG/DL (ref 8.6–10.2)
CHLAMYDOPHILIA PNEUMONIAE BY PCR: NOT DETECTED
CHLORIDE BLD-SCNC: 107 MMOL/L (ref 98–107)
CO2: 24 MMOL/L (ref 22–29)
CORONAVIRUS 229E BY PCR: NOT DETECTED
CORONAVIRUS HKU1 BY PCR: NOT DETECTED
CORONAVIRUS NL63 BY PCR: NOT DETECTED
CORONAVIRUS OC43 BY PCR: NOT DETECTED
CREAT SERPL-MCNC: 1.5 MG/DL (ref 0.5–1)
EOSINOPHILS ABSOLUTE: 0.21 E9/L (ref 0.05–0.5)
EOSINOPHILS RELATIVE PERCENT: 3.6 % (ref 0–6)
FERRITIN: 338 NG/ML
FOLATE: >20 NG/ML (ref 4.8–24.2)
GFR AFRICAN AMERICAN: 39
GFR NON-AFRICAN AMERICAN: 39 ML/MIN/1.73
GLUCOSE BLD-MCNC: 75 MG/DL (ref 74–99)
HCT VFR BLD CALC: 33.7 % (ref 34–48)
HEMOGLOBIN: 10.3 G/DL (ref 11.5–15.5)
HUMAN METAPNEUMOVIRUS BY PCR: NOT DETECTED
HUMAN RHINOVIRUS/ENTEROVIRUS BY PCR: NOT DETECTED
IMMATURE GRANULOCYTES #: 0.02 E9/L
IMMATURE GRANULOCYTES %: 0.3 % (ref 0–5)
INFLUENZA A BY PCR: NOT DETECTED
INFLUENZA B BY PCR: NOT DETECTED
IRON SATURATION: 20 % (ref 15–50)
IRON: 38 MCG/DL (ref 37–145)
LYMPHOCYTES ABSOLUTE: 2.6 E9/L (ref 1.5–4)
LYMPHOCYTES RELATIVE PERCENT: 44 % (ref 20–42)
MAGNESIUM: 2.5 MG/DL (ref 1.6–2.6)
MCH RBC QN AUTO: 32.1 PG (ref 26–35)
MCHC RBC AUTO-ENTMCNC: 30.6 % (ref 32–34.5)
MCV RBC AUTO: 105 FL (ref 80–99.9)
MONOCYTES ABSOLUTE: 0.82 E9/L (ref 0.1–0.95)
MONOCYTES RELATIVE PERCENT: 13.9 % (ref 2–12)
MYCOPLASMA PNEUMONIAE BY PCR: NOT DETECTED
NEUTROPHILS ABSOLUTE: 2.23 E9/L (ref 1.8–7.3)
NEUTROPHILS RELATIVE PERCENT: 37.7 % (ref 43–80)
PARAINFLUENZA VIRUS 1 BY PCR: NOT DETECTED
PARAINFLUENZA VIRUS 2 BY PCR: NOT DETECTED
PARAINFLUENZA VIRUS 3 BY PCR: NOT DETECTED
PARAINFLUENZA VIRUS 4 BY PCR: NOT DETECTED
PDW BLD-RTO: 12.4 FL (ref 11.5–15)
PLATELET # BLD: 207 E9/L (ref 130–450)
PMV BLD AUTO: 10.7 FL (ref 7–12)
POTASSIUM REFLEX MAGNESIUM: 4 MMOL/L (ref 3.5–5)
RBC # BLD: 3.21 E12/L (ref 3.5–5.5)
RESPIRATORY SYNCYTIAL VIRUS BY PCR: NOT DETECTED
SODIUM BLD-SCNC: 142 MMOL/L (ref 132–146)
TOTAL CK: 44 U/L (ref 20–180)
TOTAL IRON BINDING CAPACITY: 193 MCG/DL (ref 250–450)
TOTAL PROTEIN: 7.5 G/DL (ref 6.4–8.3)
TSH SERPL DL<=0.05 MIU/L-ACNC: 1.06 UIU/ML (ref 0.27–4.2)
VITAMIN B-12: >2000 PG/ML (ref 211–946)
WBC # BLD: 5.9 E9/L (ref 4.5–11.5)

## 2020-02-01 PROCEDURE — 2580000003 HC RX 258: Performed by: INTERNAL MEDICINE

## 2020-02-01 PROCEDURE — 85025 COMPLETE CBC W/AUTO DIFF WBC: CPT

## 2020-02-01 PROCEDURE — 83550 IRON BINDING TEST: CPT

## 2020-02-01 PROCEDURE — 83540 ASSAY OF IRON: CPT

## 2020-02-01 PROCEDURE — 0100U HC RESPIRPTHGN MULT REV TRANS & AMP PRB TECH 21 TRGT: CPT

## 2020-02-01 PROCEDURE — 82607 VITAMIN B-12: CPT

## 2020-02-01 PROCEDURE — 80048 BASIC METABOLIC PNL TOTAL CA: CPT

## 2020-02-01 PROCEDURE — 36415 COLL VENOUS BLD VENIPUNCTURE: CPT

## 2020-02-01 PROCEDURE — 83735 ASSAY OF MAGNESIUM: CPT

## 2020-02-01 PROCEDURE — 99233 SBSQ HOSP IP/OBS HIGH 50: CPT | Performed by: INTERNAL MEDICINE

## 2020-02-01 PROCEDURE — 82728 ASSAY OF FERRITIN: CPT

## 2020-02-01 PROCEDURE — 1200000000 HC SEMI PRIVATE

## 2020-02-01 PROCEDURE — 80076 HEPATIC FUNCTION PANEL: CPT

## 2020-02-01 PROCEDURE — 2500000003 HC RX 250 WO HCPCS: Performed by: INTERNAL MEDICINE

## 2020-02-01 PROCEDURE — 6370000000 HC RX 637 (ALT 250 FOR IP): Performed by: INTERNAL MEDICINE

## 2020-02-01 PROCEDURE — 82746 ASSAY OF FOLIC ACID SERUM: CPT

## 2020-02-01 PROCEDURE — 6370000000 HC RX 637 (ALT 250 FOR IP): Performed by: NURSE PRACTITIONER

## 2020-02-01 PROCEDURE — 6360000002 HC RX W HCPCS: Performed by: INTERNAL MEDICINE

## 2020-02-01 PROCEDURE — APPSS30 APP SPLIT SHARED TIME 16-30 MINUTES: Performed by: NURSE PRACTITIONER

## 2020-02-01 RX ORDER — SODIUM CHLORIDE 9 MG/ML
INJECTION, SOLUTION INTRAVENOUS CONTINUOUS
Status: ACTIVE | OUTPATIENT
Start: 2020-02-01 | End: 2020-02-01

## 2020-02-01 RX ORDER — FLUCONAZOLE 100 MG/1
150 TABLET ORAL ONCE
Status: COMPLETED | OUTPATIENT
Start: 2020-02-01 | End: 2020-02-01

## 2020-02-01 RX ORDER — CEPHALEXIN 500 MG/1
500 CAPSULE ORAL EVERY 12 HOURS SCHEDULED
Status: DISCONTINUED | OUTPATIENT
Start: 2020-02-01 | End: 2020-02-05 | Stop reason: HOSPADM

## 2020-02-01 RX ADMIN — FLUCONAZOLE 150 MG: 100 TABLET ORAL at 15:02

## 2020-02-01 RX ADMIN — ANTI-FUNGAL POWDER MICONAZOLE NITRATE TALC FREE: 1.42 POWDER TOPICAL at 01:32

## 2020-02-01 RX ADMIN — ANTI-FUNGAL POWDER MICONAZOLE NITRATE TALC FREE: 1.42 POWDER TOPICAL at 21:22

## 2020-02-01 RX ADMIN — ACETAMINOPHEN 650 MG: 325 TABLET, FILM COATED ORAL at 10:11

## 2020-02-01 RX ADMIN — SODIUM CHLORIDE: 9 INJECTION, SOLUTION INTRAVENOUS at 01:32

## 2020-02-01 RX ADMIN — CEPHALEXIN 500 MG: 500 CAPSULE ORAL at 21:22

## 2020-02-01 RX ADMIN — ENOXAPARIN SODIUM 30 MG: 30 INJECTION SUBCUTANEOUS at 10:11

## 2020-02-01 RX ADMIN — ANTI-FUNGAL POWDER MICONAZOLE NITRATE TALC FREE: 1.42 POWDER TOPICAL at 08:00

## 2020-02-01 ASSESSMENT — PAIN - FUNCTIONAL ASSESSMENT: PAIN_FUNCTIONAL_ASSESSMENT: ACTIVITIES ARE NOT PREVENTED

## 2020-02-01 ASSESSMENT — PAIN DESCRIPTION - DESCRIPTORS: DESCRIPTORS: ACHING;DISCOMFORT;SORE

## 2020-02-01 ASSESSMENT — PAIN SCALES - GENERAL
PAINLEVEL_OUTOF10: 0
PAINLEVEL_OUTOF10: 0
PAINLEVEL_OUTOF10: 2

## 2020-02-01 ASSESSMENT — PAIN DESCRIPTION - FREQUENCY: FREQUENCY: INTERMITTENT

## 2020-02-01 ASSESSMENT — PAIN DESCRIPTION - PROGRESSION
CLINICAL_PROGRESSION: NOT CHANGED
CLINICAL_PROGRESSION: NOT CHANGED

## 2020-02-01 ASSESSMENT — PAIN DESCRIPTION - LOCATION: LOCATION: PERINEUM

## 2020-02-01 ASSESSMENT — PAIN DESCRIPTION - PAIN TYPE: TYPE: ACUTE PAIN

## 2020-02-01 ASSESSMENT — PAIN DESCRIPTION - ONSET: ONSET: GRADUAL

## 2020-02-01 NOTE — ED PROVIDER NOTES
Patient sent in for fatigue and weakness. She typically takes care of herself. She is unable to provide any history. The history is provided by the patient. Fatigue   Severity:  Unable to specify  Onset quality:  Unable to specify  Timing:  Unable to specify      Review of Systems   Unable to perform ROS: Mental status change   Constitutional: Positive for fatigue. Physical Exam  Vitals signs and nursing note reviewed. Constitutional:       General: She is not in acute distress. Appearance: Normal appearance. She is well-developed and normal weight. She is not ill-appearing or toxic-appearing. HENT:      Head: Normocephalic and atraumatic. Right Ear: Tympanic membrane, ear canal and external ear normal.      Left Ear: Tympanic membrane, ear canal and external ear normal.      Nose: Nose normal.      Mouth/Throat:      Mouth: Mucous membranes are moist.   Eyes:      Extraocular Movements: Extraocular movements intact. Conjunctiva/sclera: Conjunctivae normal.      Pupils: Pupils are equal, round, and reactive to light. Neck:      Musculoskeletal: Normal range of motion and neck supple. Cardiovascular:      Rate and Rhythm: Normal rate and regular rhythm. Pulses: Normal pulses. Heart sounds: Normal heart sounds. No murmur. Pulmonary:      Effort: Pulmonary effort is normal. No respiratory distress. Breath sounds: Normal breath sounds. No wheezing or rales. Abdominal:      General: Bowel sounds are normal.      Palpations: Abdomen is soft. Tenderness: There is no abdominal tenderness. There is no guarding or rebound. Genitourinary:     Vagina: No vaginal discharge. Comments: Excoriation and erythema of the groin. Skin:     General: Skin is warm and dry. Capillary Refill: Capillary refill takes less than 2 seconds. Neurological:      Cranial Nerves: No cranial nerve deficit.       Coordination: Coordination normal.      Comments: Patient is somnolent. She will arouse to verbal stimuli but does not answer questions well. She is able to move all extremities spontaneously. Procedures    MDM       EKG Interpretation    Interpreted by emergency department physician    Rhythm: normal sinus   Rate: normal  Axis: normal  Ectopy: none  Conduction: normal  ST Segments: normal  T Waves: normal  Q Waves: none    Clinical Impression: no acute changes    Gerald Sesay      Sister at the bedside. She states she has been more fatigued, confused and with a fever since yesterday. Similar to previous episodes of infection and recent pneumonia. Patient with improvement after IVF and Tylenol. Influenza is negative. I have discussed with Dr Jose Young. He will evaluate her in the ED.         --------------------------------------------- PAST HISTORY ---------------------------------------------  Past Medical History:  has a past medical history of Asthma, Cancer ([facility] [address] and Influenza B. Past Surgical History:  has a past surgical history that includes Cataract removal.    Social History:  reports that she has quit smoking. She has a 15.00 pack-year smoking history. She has never used smokeless tobacco. She reports that she does not drink alcohol or use drugs. Family History: family history includes No Known Problems in her father and mother. The patients home medications have been reviewed.     Allergies: Chlorhexidine gluconate and Penicillins    -------------------------------------------------- RESULTS -------------------------------------------------    Lab  Results for orders placed or performed during the hospital encounter of 01/31/20   CBC Auto Differential   Result Value Ref Range    WBC 6.6 4.5 - 11.5 E9/L    RBC 3.51 3.50 - 5.50 E12/L    Hemoglobin 11.3 (L) 11.5 - 15.5 g/dL    Hematocrit 36.7 34.0 - 48.0 %    .6 (H) 80.0 - 99.9 fL    MCH 32.2 26.0 - 35.0 pg    MCHC 30.8 (L) 32.0 - 34.5 %    RDW 12.8 11.5 - 15.0 fL

## 2020-02-01 NOTE — CARE COORDINATION
SS Note/Discharge plan:  SS Consult noted for d/c planning, PT/OT ordered, pt has [facility] [employer] Insurance and will NEED PRE CERT for rehab placement if recommended, sw attempted to meet with pt to discuss consult, pt currently sleeping soundly and no family present, sw left her contact phone number and SNF provider list in pt's room, will follow on Monday with pt and family for transition of care.  Electronically signed by EDWIN Hall on 2/1/2020 at 2:15 PM

## 2020-02-01 NOTE — PROGRESS NOTES
RDW 12.8 12.4    207   MPV 10.7 10.7           Radiology:   XR CHEST PORTABLE   Final Result   No acute cardiopulmonary disease. Assessment:  Principal Problem:    Generalized weakness  Active Problems:    Macrocytic anemia    Stage 3 chronic kidney disease (HCC)    Febrile illness  Resolved Problems:    * No resolved hospital problems. *      Plan:  1. Generalized weakness: PT/OT, social work following for possible placement. 2. Fever: Temp was 100.1 upon admission. Chest x-ray negative, respiratory array panel negative, UA negative. Blood and urine cultures pending. 3. Erythema and open area to left groin: Miconazole powder ordered. Start Keflex. 4. Chronic kidney disease: Cr 1.5 and appears around baseline. 5. Anemia: Iron studies pending. 6. Diastolic heart failure: Last echo was on 3/25/2017 and showed an EF of 65% and stage I diastolic dysfunction. No signs of heart failure on chest x-ray. NOTE: This report was transcribed using voice recognition software.  Every effort was made to ensure accuracy; however, inadvertent computerized transcription errors may be present.     Electronically signed by YAKOV Gonzalez CNP on 2/1/2020 at 3:48 PM

## 2020-02-02 LAB
ALBUMIN SERPL-MCNC: 2.6 G/DL (ref 3.5–5.2)
ALP BLD-CCNC: 274 U/L (ref 35–104)
ALT SERPL-CCNC: 24 U/L (ref 0–32)
ANION GAP SERPL CALCULATED.3IONS-SCNC: 12 MMOL/L (ref 7–16)
AST SERPL-CCNC: 63 U/L (ref 0–31)
BASOPHILS ABSOLUTE: 0.02 E9/L (ref 0–0.2)
BASOPHILS RELATIVE PERCENT: 0.4 % (ref 0–2)
BILIRUB SERPL-MCNC: <0.2 MG/DL (ref 0–1.2)
BILIRUBIN DIRECT: <0.2 MG/DL (ref 0–0.3)
BILIRUBIN, INDIRECT: ABNORMAL MG/DL (ref 0–1)
BUN BLDV-MCNC: 18 MG/DL (ref 8–23)
CALCIUM SERPL-MCNC: 9 MG/DL (ref 8.6–10.2)
CHLORIDE BLD-SCNC: 100 MMOL/L (ref 98–107)
CO2: 23 MMOL/L (ref 22–29)
CREAT SERPL-MCNC: 1.2 MG/DL (ref 0.5–1)
EKG ATRIAL RATE: 93 BPM
EKG P AXIS: 57 DEGREES
EKG P-R INTERVAL: 144 MS
EKG Q-T INTERVAL: 342 MS
EKG QRS DURATION: 78 MS
EKG QTC CALCULATION (BAZETT): 425 MS
EKG R AXIS: 24 DEGREES
EKG T AXIS: 38 DEGREES
EKG VENTRICULAR RATE: 93 BPM
EOSINOPHILS ABSOLUTE: 0.23 E9/L (ref 0.05–0.5)
EOSINOPHILS RELATIVE PERCENT: 4.4 % (ref 0–6)
GFR AFRICAN AMERICAN: 50
GFR NON-AFRICAN AMERICAN: 50 ML/MIN/1.73
GLUCOSE BLD-MCNC: 81 MG/DL (ref 74–99)
HCT VFR BLD CALC: 35.1 % (ref 34–48)
HEMOGLOBIN: 10.5 G/DL (ref 11.5–15.5)
IMMATURE GRANULOCYTES #: 0.01 E9/L
IMMATURE GRANULOCYTES %: 0.2 % (ref 0–5)
LYMPHOCYTES ABSOLUTE: 2.03 E9/L (ref 1.5–4)
LYMPHOCYTES RELATIVE PERCENT: 38.5 % (ref 20–42)
MAGNESIUM: 2.3 MG/DL (ref 1.6–2.6)
MCH RBC QN AUTO: 31.3 PG (ref 26–35)
MCHC RBC AUTO-ENTMCNC: 29.9 % (ref 32–34.5)
MCV RBC AUTO: 104.8 FL (ref 80–99.9)
MONOCYTES ABSOLUTE: 0.53 E9/L (ref 0.1–0.95)
MONOCYTES RELATIVE PERCENT: 10.1 % (ref 2–12)
NEUTROPHILS ABSOLUTE: 2.45 E9/L (ref 1.8–7.3)
NEUTROPHILS RELATIVE PERCENT: 46.4 % (ref 43–80)
PDW BLD-RTO: 12.2 FL (ref 11.5–15)
PHOSPHORUS: 2.3 MG/DL (ref 2.5–4.5)
PLATELET # BLD: 227 E9/L (ref 130–450)
PMV BLD AUTO: 10.9 FL (ref 7–12)
POTASSIUM SERPL-SCNC: 4.8 MMOL/L (ref 3.5–5)
RBC # BLD: 3.35 E12/L (ref 3.5–5.5)
SODIUM BLD-SCNC: 135 MMOL/L (ref 132–146)
TOTAL PROTEIN: 7.7 G/DL (ref 6.4–8.3)
VITAMIN D 25-HYDROXY: 35 NG/ML (ref 30–100)
WBC # BLD: 5.3 E9/L (ref 4.5–11.5)

## 2020-02-02 PROCEDURE — 97116 GAIT TRAINING THERAPY: CPT | Performed by: PHYSICAL THERAPIST

## 2020-02-02 PROCEDURE — 99232 SBSQ HOSP IP/OBS MODERATE 35: CPT | Performed by: INTERNAL MEDICINE

## 2020-02-02 PROCEDURE — 85025 COMPLETE CBC W/AUTO DIFF WBC: CPT

## 2020-02-02 PROCEDURE — 6370000000 HC RX 637 (ALT 250 FOR IP): Performed by: NURSE PRACTITIONER

## 2020-02-02 PROCEDURE — 36415 COLL VENOUS BLD VENIPUNCTURE: CPT

## 2020-02-02 PROCEDURE — 1200000000 HC SEMI PRIVATE

## 2020-02-02 PROCEDURE — 97161 PT EVAL LOW COMPLEX 20 MIN: CPT | Performed by: PHYSICAL THERAPIST

## 2020-02-02 PROCEDURE — 6360000002 HC RX W HCPCS: Performed by: INTERNAL MEDICINE

## 2020-02-02 PROCEDURE — 84100 ASSAY OF PHOSPHORUS: CPT

## 2020-02-02 PROCEDURE — 2500000003 HC RX 250 WO HCPCS: Performed by: NURSE PRACTITIONER

## 2020-02-02 PROCEDURE — 82306 VITAMIN D 25 HYDROXY: CPT

## 2020-02-02 PROCEDURE — 80048 BASIC METABOLIC PNL TOTAL CA: CPT

## 2020-02-02 PROCEDURE — APPSS30 APP SPLIT SHARED TIME 16-30 MINUTES: Performed by: NURSE PRACTITIONER

## 2020-02-02 PROCEDURE — 80076 HEPATIC FUNCTION PANEL: CPT

## 2020-02-02 PROCEDURE — 2580000003 HC RX 258: Performed by: NURSE PRACTITIONER

## 2020-02-02 PROCEDURE — 83735 ASSAY OF MAGNESIUM: CPT

## 2020-02-02 RX ADMIN — ANTI-FUNGAL POWDER MICONAZOLE NITRATE TALC FREE: 1.42 POWDER TOPICAL at 09:12

## 2020-02-02 RX ADMIN — ANTI-FUNGAL POWDER MICONAZOLE NITRATE TALC FREE: 1.42 POWDER TOPICAL at 20:58

## 2020-02-02 RX ADMIN — CEPHALEXIN 500 MG: 500 CAPSULE ORAL at 09:06

## 2020-02-02 RX ADMIN — CEPHALEXIN 500 MG: 500 CAPSULE ORAL at 20:58

## 2020-02-02 RX ADMIN — SODIUM PHOSPHATE, MONOBASIC, MONOHYDRATE 20 MMOL: 276; 142 INJECTION, SOLUTION INTRAVENOUS at 09:06

## 2020-02-02 RX ADMIN — ENOXAPARIN SODIUM 30 MG: 30 INJECTION SUBCUTANEOUS at 09:06

## 2020-02-02 ASSESSMENT — PAIN SCALES - GENERAL: PAINLEVEL_OUTOF10: 0

## 2020-02-02 NOTE — PROGRESS NOTES
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    Transient alteration of awareness    Viral respiratory illness    Gait disorder    Influenza B    Acute cystitis without hematuria    Sepsis due to other etiology ([facility]    Lactic acidosis    Sinusitis, acute    Mastoiditis    Generalized weakness    Macrocytic anemia    Stage 3 chronic kidney disease (HCC)    Febrile illness       Tentative placement recommendation: Subacute rehab    Equipment recommendation: Patient has needed equipment       Prior Level of Function: Patient ambulated with wheeled walker independent   Rehab Potential: good  for baseline    Plan of care: Patient will be seen    daily  for therapeutic exercise, functional retraining, endurance activities, balance exercises, family and patient education.        AM-PAC Basic Mobility        -[facility] Mobility Inpatient   How much difficulty turning over in bed?: A Little  How much difficulty sitting down on / standing up from a chair with arms?: A Lot  How much difficulty moving from lying on back to sitting on side of bed?: A Lot  How much help from another person moving to and from a bed to a chair?: A Lot  How much help from another person needed to walk in hospital room?: A Little  How much help from another person for climbing 3-5 steps with a railing?: A Lot  AM-PAC Inpatient Mobility Raw Score : 14  AM-PAC Inpatient T-Scale Score : 38.1  Mobility Inpatient CMS 0-100% Score: 61.29  Mobility Inpatient CMS G-Code Modifier : CL    Order:  EVALUATE AND TREAT      Past medical history:       Diagnosis Date    Asthma     Cancer ([facility]     throat    Influenza B 3/25/2017   ; would benefit from continued skilled PT to improve functional independence and quality of life. Patients Goal: get stronger    Patient and/or family understand and agree to plan of care    ASSESSMENT  Patient exhibits decreased strength, balance, coordination impairing functional mobility. GOALS to be met in 3 days. Bed mobility-  Supervision         Transfers-Sit to stand-Supervision      Gait:  Patient to ambulate 75 feet using wheeled walker with Supervision      Steps: Patient to go up and down 1  step(s) using 0 rail(s) with  Minimal assist         Increase strength in affected mm groups by 1/3 grade  Increase balance to allow for improvement towards functional goals. Increase endurance to allow for improvement towards functional goals. Evaluation time includes  review of current medical information, gathering information on past medical history/social history and prior level of function, completion of standardized testing/informal observation of tasks, assessment of data, and development of POC/Goals.      Evaluation Complexity: Low   Time in: 1000  Time out: 1025    CPT codes:  Therapeutic activities (73038)   5 minutes  0 unit(s),   Gait Training (65170) 8 minutes 1 unit(s) and   Neuromuscular reeducation (35824)   5 minutes  0 unit(s)      Spencer Walter, PT

## 2020-02-02 NOTE — PLAN OF CARE
Problem: Falls - Risk of:  Goal: Will remain free from falls  Description  Will remain free from falls  Outcome: Met This Shift  Note:   No falls     Problem: Risk for Impaired Skin Integrity  Goal: Tissue integrity - skin and mucous membranes  Description  Structural intactness and normal physiological function of skin and  mucous membranes.   Outcome: Met This Shift  Note:   No skin breakdown

## 2020-02-02 NOTE — PLAN OF CARE
Problem: Falls - Risk of:  Goal: Will remain free from falls  Description  Will remain free from falls  2/1/2020 2203 by Rsuty Jurado RN  Outcome: Met This Shift     Problem: Falls - Risk of:  Goal: Absence of physical injury  Description  Absence of physical injury  2/1/2020 2203 by Rusty Jurado RN  Outcome: Met This Shift     Problem: Risk for Impaired Skin Integrity  Goal: Tissue integrity - skin and mucous membranes  Description  Structural intactness and normal physiological function of skin and  mucous membranes.   2/1/2020 2203 by Rusty Jurado RN  Outcome: Met This Shift

## 2020-02-03 ENCOUNTER — APPOINTMENT (OUTPATIENT)
Dept: GENERAL RADIOLOGY | Age: 85
DRG: 884 | End: 2020-02-03
Payer: COMMERCIAL

## 2020-02-03 LAB
ALBUMIN SERPL-MCNC: 2.7 G/DL (ref 3.5–5.2)
ALP BLD-CCNC: 259 U/L (ref 35–104)
ALT SERPL-CCNC: 17 U/L (ref 0–32)
ANION GAP SERPL CALCULATED.3IONS-SCNC: 11 MMOL/L (ref 7–16)
AST SERPL-CCNC: 32 U/L (ref 0–31)
BILIRUB SERPL-MCNC: 0.2 MG/DL (ref 0–1.2)
BILIRUBIN DIRECT: <0.2 MG/DL (ref 0–0.3)
BILIRUBIN, INDIRECT: ABNORMAL MG/DL (ref 0–1)
BUN BLDV-MCNC: 16 MG/DL (ref 8–23)
CALCIUM SERPL-MCNC: 9.1 MG/DL (ref 8.6–10.2)
CHLORIDE BLD-SCNC: 100 MMOL/L (ref 98–107)
CO2: 25 MMOL/L (ref 22–29)
CREAT SERPL-MCNC: 1.4 MG/DL (ref 0.5–1)
GFR AFRICAN AMERICAN: 42
GFR NON-AFRICAN AMERICAN: 42 ML/MIN/1.73
GLUCOSE BLD-MCNC: 81 MG/DL (ref 74–99)
HCT VFR BLD CALC: 33.7 % (ref 34–48)
HEMOGLOBIN: 10.3 G/DL (ref 11.5–15.5)
MAGNESIUM: 2.2 MG/DL (ref 1.6–2.6)
MCH RBC QN AUTO: 31.4 PG (ref 26–35)
MCHC RBC AUTO-ENTMCNC: 30.6 % (ref 32–34.5)
MCV RBC AUTO: 102.7 FL (ref 80–99.9)
PDW BLD-RTO: 12.4 FL (ref 11.5–15)
PHOSPHORUS: 3.1 MG/DL (ref 2.5–4.5)
PLATELET # BLD: 230 E9/L (ref 130–450)
PMV BLD AUTO: 10.4 FL (ref 7–12)
POTASSIUM SERPL-SCNC: 4.6 MMOL/L (ref 3.5–5)
RBC # BLD: 3.28 E12/L (ref 3.5–5.5)
REASON FOR REJECTION: NORMAL
REJECTED TEST: NORMAL
SODIUM BLD-SCNC: 136 MMOL/L (ref 132–146)
TOTAL PROTEIN: 7.7 G/DL (ref 6.4–8.3)
URINE CULTURE, ROUTINE: NORMAL
WBC # BLD: 5 E9/L (ref 4.5–11.5)

## 2020-02-03 PROCEDURE — 97530 THERAPEUTIC ACTIVITIES: CPT

## 2020-02-03 PROCEDURE — 92526 ORAL FUNCTION THERAPY: CPT | Performed by: SPEECH-LANGUAGE PATHOLOGIST

## 2020-02-03 PROCEDURE — 97535 SELF CARE MNGMENT TRAINING: CPT

## 2020-02-03 PROCEDURE — 84100 ASSAY OF PHOSPHORUS: CPT

## 2020-02-03 PROCEDURE — 83735 ASSAY OF MAGNESIUM: CPT

## 2020-02-03 PROCEDURE — 6370000000 HC RX 637 (ALT 250 FOR IP): Performed by: NURSE PRACTITIONER

## 2020-02-03 PROCEDURE — 85027 COMPLETE CBC AUTOMATED: CPT

## 2020-02-03 PROCEDURE — 36415 COLL VENOUS BLD VENIPUNCTURE: CPT

## 2020-02-03 PROCEDURE — 74230 X-RAY XM SWLNG FUNCJ C+: CPT

## 2020-02-03 PROCEDURE — 1200000000 HC SEMI PRIVATE

## 2020-02-03 PROCEDURE — 80076 HEPATIC FUNCTION PANEL: CPT

## 2020-02-03 PROCEDURE — 99233 SBSQ HOSP IP/OBS HIGH 50: CPT | Performed by: INTERNAL MEDICINE

## 2020-02-03 PROCEDURE — 2500000003 HC RX 250 WO HCPCS: Performed by: INTERNAL MEDICINE

## 2020-02-03 PROCEDURE — 92611 MOTION FLUOROSCOPY/SWALLOW: CPT | Performed by: SPEECH-LANGUAGE PATHOLOGIST

## 2020-02-03 PROCEDURE — 2580000003 HC RX 258: Performed by: INTERNAL MEDICINE

## 2020-02-03 PROCEDURE — 97165 OT EVAL LOW COMPLEX 30 MIN: CPT

## 2020-02-03 PROCEDURE — 80048 BASIC METABOLIC PNL TOTAL CA: CPT

## 2020-02-03 RX ADMIN — CEPHALEXIN 500 MG: 500 CAPSULE ORAL at 20:47

## 2020-02-03 RX ADMIN — ANTI-FUNGAL POWDER MICONAZOLE NITRATE TALC FREE: 1.42 POWDER TOPICAL at 10:00

## 2020-02-03 RX ADMIN — Medication 10 ML: at 20:48

## 2020-02-03 RX ADMIN — BARIUM SULFATE 45 G: 0.6 CREAM ORAL at 09:48

## 2020-02-03 RX ADMIN — ANTI-FUNGAL POWDER MICONAZOLE NITRATE TALC FREE: 1.42 POWDER TOPICAL at 20:48

## 2020-02-03 RX ADMIN — BARIUM SULFATE 88 G: 960 POWDER, FOR SUSPENSION ORAL at 09:47

## 2020-02-03 ASSESSMENT — PAIN SCALES - GENERAL
PAINLEVEL_OUTOF10: 0

## 2020-02-03 NOTE — PROGRESS NOTES
balance to allow for improvement towards functional goals. Increase endurance to allow for improvement towards functional goals.   KENDALL HUNTER, PTA

## 2020-02-03 NOTE — CARE COORDINATION
SS Note/Discharge plan:  Referral made to Chai Caro at [facility] for [facility] placement per pt's dtr, Carmen's request, rehab bed is available and will follow for therapy evals to SUBMIT PRE CERT TODAY 2/3, VICTORIA generated in Epic and HENS exemption completed, nursing and dtr informed.  Electronically signed by EDWIN Stephenson on 2/3/2020 at 2:28 PM

## 2020-02-03 NOTE — DISCHARGE INSTR - COC
Isolation        Patient Infection Status     None to display          Nurse Assessment:  Last Vital Signs: BP (!) 98/49   Pulse 84   Temp 98.1 °F (36.7 °C) (Axillary)   Resp 20   Ht 5' (1.524 m)   Wt 127 lb (57.6 kg)   SpO2 92%   BMI 24.80 kg/m²     Last documented pain score (0-10 scale): Pain Level: 0  Last Weight:   Wt Readings from Last 1 Encounters:   02/03/20 127 lb (57.6 kg)     Mental Status:  oriented, alert and Confused at times    IV Access:  - None    Nursing Mobility/ADLs:  Walking   Assisted  Transfer  Assisted  Bathing  Assisted  Dressing  Assisted  Toileting  Assisted  Feeding  Independent  Med Admin  Assisted  Med Delivery   whole    Wound Care Documentation and Therapy:        Elimination:  Continence:   · Bowel: No  · Bladder: No  Urinary Catheter: None   Colostomy/Ileostomy/Ileal Conduit: No       Date of Last BM: 2/4/2020    Intake/Output Summary (Last 24 hours) at 2/3/2020 1432  Last data filed at 2/3/2020 0959  Gross per 24 hour   Intake 210 ml   Output 0 ml   Net 210 ml     I/O last 3 completed shifts: In: 12 [P.O.:450]  Out: 0     Safety Concerns: At Risk for Falls    Impairments/Disabilities:      None    Nutrition Therapy:  Current Nutrition Therapy:   - Oral Diet:  Dysphagia 1 pureed    Routes of Feeding: Oral  Liquids: Nectar Thick Liquids  Daily Fluid Restriction: no  Last Modified Barium Swallow with Video (Video Swallowing Test): not done    Treatments at the Time of Hospital Discharge:   Respiratory Treatments: ***  Oxygen Therapy:  is on oxygen at 2 L/min per nasal cannula.   Ventilator:    - No ventilator support    Rehab Therapies: Physical Therapy and Occupational Therapy  Weight Bearing Status/Restrictions: No weight bearing restirctions  Other Medical Equipment (for information only, NOT a DME order):  {EQUIPMENT:[id number]}  Other Treatments: ***    Patient's personal belongings (please select all that are sent with patient):  Dentures upper and lower    RN SIGNATURE:  Electronically signed by Naila Hsu RN on 2/5/20 at 2:01 PM    CASE MANAGEMENT/SOCIAL WORK SECTION    Inpatient Status Date: 01/31/2020    Readmission Risk Assessment Score:  Readmission Risk              Risk of Unplanned Readmission:        18           Discharging to Facility/ Agency   · Name:  [facility]  · Address:  · Phone: [phone]  · Fax: [phone]    Dialysis Facility (if applicable)   · Name:  · Address:  · Dialysis Schedule:  · Phone:  · Fax:    / signature: Electronically signed by EDWIN Benito on 2/3/20 at 2:32 PM    PHYSICIAN SECTION    Prognosis: Good    Condition at Discharge: Stable    Rehab Potential (if transferring to Rehab): Good    Recommended Labs or Other Treatments After Discharge: ***    Physician Certification: I certify the above information and transfer of Jemima Hernandez  is necessary for the continuing treatment of the diagnosis listed and that she requires [place] for less 30 days.      Update Admission H&P: {CHP DME Changes in ZSLTE:[id number]}    PHYSICIAN SIGNATURE:  Electronically signed by Fanta Dsouza MD on 2/5/20 3:23 PM

## 2020-02-03 NOTE — CARE COORDINATION
Met with daughter to discuss PT/OT recommendations for subacute rehab. Patient has a history of being placed at [facility] [address] in [place]. Daughter given list of Medicare certified skilled nursing facilities in [facility] for review. Will follow up later this afternoon for choice.   Electronically signed by Antonino Burks RN on 2/3/2020 at 11:28 AM

## 2020-02-03 NOTE — PROGRESS NOTES
don incontinent garment at edge of bed   Independent    Bathing Moderate Assist  Independent    Toileting Moderate Assist   Independent    Bed Mobility  Supine to sit: Minimal Assist   Sit to supine: Minimal Assist onto transport cart    Supine to sit: Independent   Sit to supine: Independent    Functional Transfers Minimal Assist from EOB x 2 reps   Independent    Functional Mobility Minimal Assist with wheeled walker  Modified [place]    Balance Sitting:     Static:  good    Dynamic:fair  Standing: fair     Activity Tolerance fair  good      Left Right Comment   Passive range of motion [facility]     Active range of motion [place]  WFL with exception of limited shoulder flexion     Muscle Grade 4/5 2+/5 proximally  3/5 distally     /pinch Strength Intact  Intact     Additional Information: Good  and wfl FMC/dexterity noted during ADL tasks Good  and wfl FMC/dexterity noted during ADL tasks      Comments: Upon arrival patient was supine in bed. At end of session, patient was assisted onto transport cart with call light and phone within reach, all lines and tubes intact. Overall patient demonstrated decreased independence and safety during completion of ADL/functional transfer/mobility tasks. Pt would benefit from continued skilled OT to increase safety and independence with completion of ADL/IADL tasks for functional independence and quality of life. Treatment: Therapist facilitated bed mobility. Sitting balance at EOB for 9 minutes with supervision to increase dynamic sitting balance and activity tolerance. Assistance for lower body dressing (don incontinent garment), Transfer training with verbal cues to for hand placement, static standing balance with wheeled walker, functional mobility with wheeled walker, verbal cues for walker sequence and safety. OT services provided include instruction/training on safety and adapted techniques for completion of ADL's.      Eval Complexity:  · Low Complexity  · History: Brief history including review of medical records relating to the problem  · Exam: 1-3 performance Deficits  · Assistance/Modification: No assistance or modifications required to perform tasks. No comorbities affecting occupational performance. Assessment of current deficits   Functional mobility [x]        ADLs [x]        Strength [x]      Cognition []  Functional transfers  [x]       IADLs [x]        Safety Awareness [x]      Endurance [x]  Fine Motor Coordination []       Balance [x]       Vision/perception []     Sensation []   Gross Motor Coordination []       ROM [x]         Delirium []                          Motor Control []    Plan of Care: OT 1-3x/week PRN during hospitalization  ADL retraining [x]   Equipment needs [x]   Neuromuscular re-education [x] Energy Conservation Techniques [x]  Functional Transfer training [x] Patient and/or Family Education []  Functional Mobility training []  Environmental Modifications []  Cognitive re-training []   Compensatory techniques for ADLs [x]  Splinting Needs []   Positioning to improve overall function [x]   Therapeutic Activity [x]  Therapeutic Exercise  []  Visual/Perceptual: []    Delirium prevention/treatment  []  Other:  []     Rehab Potential: Good for established goals     Patient / Family Goal: get stronger      Patient and/or family were instructed on functional diagnosis, prognosis/goals and OT plan of care. Demonstrated fair understanding.      Treatment Time In:9:18am   Treatment Time Out: 9:28am                Treatment Charges: Mins Units   ADL/Home Mgt                    62353 2    Therapeutic Activities          54614 8    Therapeutic Exercise          93452     Manual Therapy                  95975     Neuro Re-ed                       29745     Orthotic manage/training    32489     Total Timed Treatment 10 1     Evaluation time includes thorough review of current medical information, gathering information on past medical

## 2020-02-03 NOTE — PROGRESS NOTES
SPEECH/LANGUAGE PATHOLOGY  VIDEOFLUOROSCOPIC STUDY OF SWALLOWING (MBS)    PATIENT NAME:  Telly Bowman      :  1918      TODAY'S DATE:  2/3/2020  ROOM:  0330/0330-01    SUMMARY OF EVALUATION     DYSPHAGIA DIAGNOSIS:  Mild oropharyngeal dysphagia       DIET RECOMMENDATIONS:  Dysphagia 1, Pureed solids with  nectar consistency (mildly thick) liquids with ulloa water protocol if patient able to safely implement     FEEDING RECOMMENDATIONS:     Assistance level:  Set-up is required for all oral intake      Compensatory strategies recommended: Throat clear, No straw and Effortful swallow    THERAPY RECOMMENDATIONS:      Dysphagia therapy is recommended 3-5 times per week with emphasis on the following, exercises to improve tongue base retraction,  Exercises to improve laryngeal elevation and closure and ongoing analysis during PO intake to make recommendations for diet changes and implement compensatory strategies as appropriate    Patient report: sore mouth    Diet prior to admit:    Dysphagia 1, Pureed solids with  nectar consistency (mildly thick) liquids    Communication/Cognition:  Within functional limits for this exam               PROCEDURE       MBSImP Results:   Lip closure for intraoral bolus containment resulted in no labial escape. Tongue control during bolus hold maintained a cohesive bolus held between tongue to palate seal. Bolus preparation and mastication solids not given due to increased oral time with all other items. Bolus transport/lingual motion was with slow tongue motion. Oral residue was not observed. Initiation of the pharyngeal swallow occurred as the bolus head reached the valleculae. Soft palate elevation resulted in no bolus between the soft palate and the pharyngeal wall. Laryngeal elevation demonstrated incomplete superior movement of the thyroid cartilage with complete approximation of the arytenoids to the epiglottic petiole.  Anterior hyoid excursion demonstrated complete anterior movement. Epiglottic movement resulted in incomplete inversion. Laryngeal vestibule closure was incomplete, as indicated by a trace column of air or contrast within the laryngeal vestibule at the height of the swallow. Pharyngeal stripping wave was present and complete. Pharyngeal contraction could not be determined due to logistical reasons not related to physiologic impairment. Pharyngoesophageal segment opening was completely distended for complete duration with no obstruction of bolus flow. Tongue base retraction allowed a trace coating of contrast between the retracted tongue base and the posterior pharyngeal wall. Pharyngeal residue was present mainly in the valleculae but cleared with a spontaneous second swallow and did not result in trickle down penetration. Esophageal clearance in the upright position could not be assessed due to logistical reasons not related to physiologic impairment. Laryngeal Penetration and Aspiration:  Neither penetration nor aspiration was observed in today's study with Pudding-thick, and Nectar-thick,     Trace shallow penetration of Thin via self administered cup sips which did not remain in laryngeal vestibule. Current Respiratory Status   room air         COMPENSATORY STRATEGIES       Compensatory strategies that were beneficial included Double swallow, Small bites/sips and Throat clear      STRUCTURAL/FUNCTIONAL ANOMALIES       No structural/functional anomalies were noted                                 The Speech Language Pathologist (SLP) completed education with the patient regarding results of evaluation.  Explained that Speech Pathology intervention is not warranted at this time, Prognosis for improvements is good, This plan will be re-evaluated and revised in 1 week  if warranted. , Patient stated goals: Agreed with above, Treatment goals discussed with Patient, The Patient did not demonstrate full understanding of the diagnosis, prognosis and plan

## 2020-02-03 NOTE — PROGRESS NOTES
Speech Language Pathology      NAME:  Miladys An  :  1918  DATE: 2/3/2020  ROOM:  0330/0330-01    Patient seen for swallow therapy 10 minutes. Daughter in law present. Patient has been on nectar thick for at least a year per family. Per family decreased intake of liquids at times. She does report in the past when on thin increased cough was present. Discussed ulloa water protocol and patient and family are interested in attempting to initiate if appropriate.       Generalized weakness [R53.1]    Leo Cohen MSCCC/SLP  Speech Language Pathologist  [id number]

## 2020-02-03 NOTE — PLAN OF CARE
Problem: Falls - Risk of:  Goal: Will remain free from falls  Description  Will remain free from falls  2/3/2020 1051 by Bee Garcia RN  Outcome: Met This Shift  Note:   No falls  2/2/2020 2115 by Edmundo Perez RN  Outcome: Met This Shift     Problem: Risk for Impaired Skin Integrity  Goal: Tissue integrity - skin and mucous membranes  Description  Structural intactness and normal physiological function of skin and  mucous membranes.   Outcome: Met This Shift  Note:   No skin breakdown

## 2020-02-04 LAB
HCT VFR BLD CALC: 35.4 % (ref 34–48)
HEMOGLOBIN: 10.8 G/DL (ref 11.5–15.5)
MCH RBC QN AUTO: 31 PG (ref 26–35)
MCHC RBC AUTO-ENTMCNC: 30.5 % (ref 32–34.5)
MCV RBC AUTO: 101.7 FL (ref 80–99.9)
PDW BLD-RTO: 12.6 FL (ref 11.5–15)
PLATELET # BLD: 239 E9/L (ref 130–450)
PMV BLD AUTO: 10.2 FL (ref 7–12)
RBC # BLD: 3.48 E12/L (ref 3.5–5.5)
WBC # BLD: 5.5 E9/L (ref 4.5–11.5)

## 2020-02-04 PROCEDURE — 2580000003 HC RX 258: Performed by: INTERNAL MEDICINE

## 2020-02-04 PROCEDURE — 6360000002 HC RX W HCPCS: Performed by: INTERNAL MEDICINE

## 2020-02-04 PROCEDURE — 97530 THERAPEUTIC ACTIVITIES: CPT

## 2020-02-04 PROCEDURE — 6370000000 HC RX 637 (ALT 250 FOR IP): Performed by: NURSE PRACTITIONER

## 2020-02-04 PROCEDURE — 1200000000 HC SEMI PRIVATE

## 2020-02-04 PROCEDURE — 99233 SBSQ HOSP IP/OBS HIGH 50: CPT | Performed by: INTERNAL MEDICINE

## 2020-02-04 PROCEDURE — 36415 COLL VENOUS BLD VENIPUNCTURE: CPT

## 2020-02-04 PROCEDURE — 92526 ORAL FUNCTION THERAPY: CPT | Performed by: SPEECH-LANGUAGE PATHOLOGIST

## 2020-02-04 PROCEDURE — 85027 COMPLETE CBC AUTOMATED: CPT

## 2020-02-04 RX ADMIN — ANTI-FUNGAL POWDER MICONAZOLE NITRATE TALC FREE: 1.42 POWDER TOPICAL at 10:03

## 2020-02-04 RX ADMIN — CEPHALEXIN 500 MG: 500 CAPSULE ORAL at 20:12

## 2020-02-04 RX ADMIN — ENOXAPARIN SODIUM 30 MG: 30 INJECTION SUBCUTANEOUS at 11:11

## 2020-02-04 RX ADMIN — Medication 10 ML: at 20:13

## 2020-02-04 RX ADMIN — ANTI-FUNGAL POWDER MICONAZOLE NITRATE TALC FREE: 1.42 POWDER TOPICAL at 20:12

## 2020-02-04 RX ADMIN — Medication 10 ML: at 10:46

## 2020-02-04 ASSESSMENT — PAIN SCALES - GENERAL
PAINLEVEL_OUTOF10: 0

## 2020-02-04 NOTE — PLAN OF CARE
Problem: Falls - Risk of:  Goal: Will remain free from falls  Description  Will remain free from falls  Outcome: Met This Shift  Goal: Absence of physical injury  Description  Absence of physical injury  Outcome: Met This Shift     Problem: Risk for Impaired Skin Integrity  Goal: Tissue integrity - skin and mucous membranes  Description  Structural intactness and normal physiological function of skin and  mucous membranes.   Outcome: Met This Shift     Problem: Musculor/Skeletal Functional Status  Goal: Highest potential functional level  Outcome: Met This Shift

## 2020-02-04 NOTE — PROGRESS NOTES
Speech Language Pathology      NAME:  Mohit Butts  :  1918  DATE: 2020  ROOM:  0321/0321-02    Patient seen for dysphagia therapy 18 minutes. Moist cough on initial swallow of nectar thick but then remainder of intake no cough present. Patient with extended oral phase continually stating she needed to keep chewing the purees before she could swallow them. Several times spit the fully pulverized items out stating she could not chew them enough to swallow. Not able to change this pattern even with max verbal instruction. Patient did not exhibit this pattern during previous session.   Will continue POC    Generalized weakness [R53.1]    Melissa Hannon MSCCC/SLP  Speech Language Pathologist  [id number]

## 2020-02-04 NOTE — PROGRESS NOTES
AMBULATED 40 FT TO BATHROOM AND HAD A BOWEL MOVEMENT. SAT BACK DOWN INTO CHAIR EYES STARTED TO ROLL BACK . RETURNED BACK TO BED. /65 PULSE 90  RESP20.  ORIENTATION TO NAME AND PLACE

## 2020-02-05 VITALS
WEIGHT: 126.3 LBS | DIASTOLIC BLOOD PRESSURE: 60 MMHG | HEART RATE: 84 BPM | TEMPERATURE: 98.1 F | HEIGHT: 60 IN | BODY MASS INDEX: 24.8 KG/M2 | RESPIRATION RATE: 16 BRPM | OXYGEN SATURATION: 95 % | SYSTOLIC BLOOD PRESSURE: 107 MMHG

## 2020-02-05 LAB
ANION GAP SERPL CALCULATED.3IONS-SCNC: 16 MMOL/L (ref 7–16)
BUN BLDV-MCNC: 18 MG/DL (ref 8–23)
CALCIUM SERPL-MCNC: 9.1 MG/DL (ref 8.6–10.2)
CHLORIDE BLD-SCNC: 100 MMOL/L (ref 98–107)
CO2: 21 MMOL/L (ref 22–29)
CREAT SERPL-MCNC: 1.7 MG/DL (ref 0.5–1)
GFR AFRICAN AMERICAN: 33
GFR NON-AFRICAN AMERICAN: 33 ML/MIN/1.73
GLUCOSE BLD-MCNC: 70 MG/DL (ref 74–99)
HCT VFR BLD CALC: 33.8 % (ref 34–48)
HEMOGLOBIN: 10.5 G/DL (ref 11.5–15.5)
MCH RBC QN AUTO: 31.9 PG (ref 26–35)
MCHC RBC AUTO-ENTMCNC: 31.1 % (ref 32–34.5)
MCV RBC AUTO: 102.7 FL (ref 80–99.9)
PDW BLD-RTO: 12.6 FL (ref 11.5–15)
PLATELET # BLD: 244 E9/L (ref 130–450)
PMV BLD AUTO: 10.2 FL (ref 7–12)
POTASSIUM SERPL-SCNC: 4.2 MMOL/L (ref 3.5–5)
RBC # BLD: 3.29 E12/L (ref 3.5–5.5)
SODIUM BLD-SCNC: 137 MMOL/L (ref 132–146)
WBC # BLD: 5 E9/L (ref 4.5–11.5)

## 2020-02-05 PROCEDURE — 99238 HOSP IP/OBS DSCHRG MGMT 30/<: CPT | Performed by: INTERNAL MEDICINE

## 2020-02-05 PROCEDURE — 85027 COMPLETE CBC AUTOMATED: CPT

## 2020-02-05 PROCEDURE — 97530 THERAPEUTIC ACTIVITIES: CPT

## 2020-02-05 PROCEDURE — 2700000000 HC OXYGEN THERAPY PER DAY

## 2020-02-05 PROCEDURE — 80048 BASIC METABOLIC PNL TOTAL CA: CPT

## 2020-02-05 PROCEDURE — 36415 COLL VENOUS BLD VENIPUNCTURE: CPT

## 2020-02-05 PROCEDURE — 6360000002 HC RX W HCPCS: Performed by: INTERNAL MEDICINE

## 2020-02-05 PROCEDURE — 6370000000 HC RX 637 (ALT 250 FOR IP): Performed by: NURSE PRACTITIONER

## 2020-02-05 RX ORDER — CEPHALEXIN 500 MG/1
500 CAPSULE ORAL EVERY 12 HOURS SCHEDULED
Qty: 6 CAPSULE | Refills: 0 | Status: SHIPPED | OUTPATIENT
Start: 2020-02-05 | End: 2020-02-08

## 2020-02-05 RX ORDER — ACETAMINOPHEN 160 MG/5ML
650 SOLUTION ORAL EVERY 6 HOURS PRN
Qty: 1200 ML | Refills: 1 | Status: CANCELLED | OUTPATIENT
Start: 2020-02-05 | End: 2020-04-05

## 2020-02-05 RX ADMIN — CEPHALEXIN 500 MG: 500 CAPSULE ORAL at 10:21

## 2020-02-05 RX ADMIN — ANTI-FUNGAL POWDER MICONAZOLE NITRATE TALC FREE: 1.42 POWDER TOPICAL at 10:21

## 2020-02-05 RX ADMIN — ENOXAPARIN SODIUM 30 MG: 30 INJECTION SUBCUTANEOUS at 10:20

## 2020-02-05 ASSESSMENT — PAIN SCALES - GENERAL
PAINLEVEL_OUTOF10: 0
PAINLEVEL_OUTOF10: 0

## 2020-02-05 NOTE — CARE COORDINATION
SS Note/Discharge plan:  Notified by Vinny Ortega liaison for [facility] that they received insurance authorization for skilled admission, nursing notified, sw will follow for medical d/c and to confirm transfer arrangements. Electronically signed by EDWIN Escobar on 2/5/2020 at 12:30 PM

## 2020-02-05 NOTE — PLAN OF CARE
Problem: Falls - Risk of:  Goal: Will remain free from falls  Description  Will remain free from falls  2/5/2020 1300 by Nayeli Medina RN  Outcome: Met This Shift  Note:   No falls  2/5/2020 0159 by Maura Harley RN  Outcome: Met This Shift     Problem: Risk for Impaired Skin Integrity  Goal: Tissue integrity - skin and mucous membranes  Description  Structural intactness and normal physiological function of skin and  mucous membranes.   2/5/2020 1300 by Nayeli Medina RN  Outcome: Met This Shift  Note:   Skin will show improvement  2/5/2020 0159 by Maura Harley RN  Outcome: Met This Shift

## 2020-02-05 NOTE — DISCHARGE SUMMARY
[facility]       Hospitalist Physician Discharge Summary       Nieves Gastelum MD  [address] [place] [phone]    Call  As needed    CM Fermín Chapman of the [place] [id number] [address] [address]  [phone]          Activity level: As tolerated    Diet: DIET GENERAL; Dysphagia Pureed; Mildly Thick ([place])    Labs: No labs pending    Condition at discharge: Stable    Dispo: SNF    Patient ID:  Bella Barba  [id number]  89 y.o.  8/27/1918    Admit date: 1/31/2020    Discharge date and time:  2/5/2020  2:04 PM    Admission Diagnoses: Principal Problem:    Generalized weakness  Active Problems:    Macrocytic anemia    Stage 3 chronic kidney disease ([address]    Febrile illness  Resolved Problems:    * No resolved hospital problems. *      Discharge Diagnoses: Principal Problem:    Generalized weakness  Active Problems:    Macrocytic anemia    Stage 3 chronic kidney disease (HCC)    Febrile illness  Resolved Problems:    * No resolved hospital problems. *      Consults:  IP CONSULT TO SOCIAL WORK    History of Present Illness:    Informant(s) for H&P:Pt and chart   Bella Barba is a 80 y.o. 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 She usually takes care of herself, lives alone. But the last two days she has been bed bound very weak. Has been having Left groin pain, ulcerated area for 1 week    · Denied any fever, nausea, vomiting, diarrhea, abdominal pain, blood in stool or black stool.    · Denied any chest pain or SOB, no recent lightheadedness or syncope      In ER her temp was 100.1   CXR not remarkable    Hospital Course: During course of hospitalization, patient was evaluated for generalized weakness due to age-related physical debility by physical therapy/Occupational Therapy. Based on evaluation she was recommended placement for subacute rehab. Case management and  discussed with family regarding placement options. Meanwhile, patient had intertrigo of left groin with possible cellulitis. Is thought to be likely Candida and she was given dose of Diflucan. She is continued on muco-[place] powder and oral Keflex. Her chronic kidney disease was monitored and creatinine was 1.5 on presentation which appears to be around her baseline. However hemoglobin stayed stable. Iron studies done:  Ferritin (338), Iron (38), Iron sat (20), TIBC (193), folate (>20.0), Vitamin B-12 (>2000). Patient did have diastolic heart failure history and last echo on 3/25/2017 showed EF of 65% with stage I diastolic dysfunction. She had no signs of heart failure on chest x-ray. She did have some dysphasia and speech therapy was consulted. Barium swallow study was done and patient was recommended puréed solids with nectar consistency (mildly thick) liquids with Merlynn Chaz water protocol if patient able to safely implement. Eventually, patient was excepted to [address] [place] [address]. Patient was stable upon discharge with instructions for 3 more days of Keflex to finish antibiotic course and to continue miconacole as well. Patient to follow-up with family physician within 1 week.     Discharge Exam:  Vitals:    02/04/20 1812 02/05/20 0010 02/05/20 0044 02/05/20 0600   BP: (!) 132/57   107/60   Pulse: 92   84   Resp: 16   16   Temp: 98.4 °F (36.9 °C)   98.1 °F (36.7 °C)   TempSrc: Oral   Oral   SpO2: 98% 98%     Weight:   126 lb 4.8 oz (57.3 kg)    Height:           General Appearance: Frail, elderly individual who is hard of hearing; alert and oriented to person, place and in no acute distress  Skin: warm and dry, erythema  Head: normocephalic and atraumatic  Eyes: pupils equal, round, and reactive to light, conjunctivae normal  Neck: trachea midline  Pulmonary/Chest: clear to disintegrating tablet Comments:   Reason for Stopping:         zinc oxide (DESITIN) 40 % ointment Comments:   Reason for Stopping:         nystatin (MYCOSTATIN) POWD powder Comments:   Reason for Stopping:         Magic Mouthwash (MIRACLE MOUTHWASH) Comments:   Reason for Stopping:                 Note that more than 30 minutes was spent in preparing discharge papers, discussing discharge with patient, medication review, etc.    NOTE: This report was transcribed using voice recognition software.  Every effort was made to ensure accuracy; however, inadvertent computerized transcription errors may be present.     Signed:  Electronically signed by Sherrill Modi MD on 2/5/2020 at 2:04 PM

## 2020-02-05 NOTE — PROGRESS NOTES
visual analog scale with 0=no pain and 10=excruciating pain0 /10. FUNCTIONAL ASSESSMENT   Bed Mobility- Supine to sit- Minimal assist         Scooting- Minimal assist x 1     Sit to supine- Not assessed          Transfers-Sit to stand- Minimal assist with cues for technique and safety    Gait:  Patient ambulated 20 feet  using wheeled walker with Minimal assist . V/C for upright posture, pacing, increased step length/JOSE, and safety. . Pt with flexed posture. Steps: Not assessed         Treatment:   Therapist educated and facilitated patient on techniques to increase safety and independence with bed mobility, balance, functional transfers, and functional mobility. Exercises:  Pt performed ap, laq, hip flex, bicep curls, grasp/relax x 10- 20 reps     . Patient continues to benefit from skilled PT to improve functional independence and quality of life. A:  Patient making progress in the following areas: pt with decreased assist for above, . Pt displayed a narrow JOSE, small step length, and kyphotic posture during ambulation with cueing to correct. Time in: 11:05  Time out: 11:29    CPT codes:  Therapeutic activities (56730)   24 minutes  2 unit(s),      GOALS to be met in 3 days. Bed mobility-  Supervision                                          Transfers-Sit to stand-Supervision                 Gait:  Patient to ambulate 75 feet using wheeled walker with Supervision                 Steps: Patient to go up and down 1  step(s) using 0 rail(s) with  Minimal assist          Increase strength in affected mm groups by 1/3 grade  Increase balance to allow for improvement towards functional goals. Increase endurance to allow for improvement towards functional goals.   Jillian Blackwell, PTA

## 2020-02-05 NOTE — PROGRESS NOTES
P Quality Flow/Interdisciplinary Rounds Progress Note        Quality Flow Rounds held on February 5, 2020    Disciplines Attending:  Bedside Nurse, ,  and Nursing Unit Leadership    Fallon Lindsay was admitted on 1/31/2020  6:38 PM    Anticipated Discharge Date:  Expected Discharge Date: 02/03/20    Disposition:    Asad Score:  Asad Scale Score: 17    Readmission Risk              Risk of Unplanned Readmission:        18           Discussed patient goal for the day, patient clinical progression, and barriers to discharge.   The following Goal(s) of the Day/Commitment(s) have been identified:  Waiting on precert for discharge to SNF      THE [facility]  February 5, 2020

## 2020-02-05 NOTE — PLAN OF CARE
Problem: Falls - Risk of:  Goal: Will remain free from falls  Description  Will remain free from falls  2/5/2020 0159 by Michael Santana RN  Outcome: Met This Shift     Problem: Falls - Risk of:  Goal: Absence of physical injury  Description  Absence of physical injury  2/5/2020 0159 by Michael Santana RN  Outcome: Met This Shift     Problem: Risk for Impaired Skin Integrity  Goal: Tissue integrity - skin and mucous membranes  Description  Structural intactness and normal physiological function of skin and  mucous membranes.   2/5/2020 0159 by Michael Santana RN  Outcome: Met This Shift     Problem: Musculor/Skeletal Functional Status  Goal: Highest potential functional level  2/5/2020 0159 by Michael Santana RN  Outcome: Met This Shift

## 2020-02-05 NOTE — CARE COORDINATION
SS Note/Discharge plan:  Contacted Jimena robbins for [facility] confirming pt's transfer for today at 4:[facility]pm [facility], pt and daughter Latha Nguyen notified and receptive to transfer arrangements, nursing notified. Electronically signed by EDWIN Hwang on 2/5/2020 at 1:52 PM

## 2020-02-06 LAB
BLOOD CULTURE, ROUTINE: NORMAL
CULTURE, BLOOD 2: NORMAL

## 2024-10-15 NOTE — PLAN OF CARE
Problem: Falls - Risk of:  Goal: Will remain free from falls  Description  Will remain free from falls  2/3/2020 1545 by Kurt Díaz RN  Outcome: Met This Shift     Problem: Falls - Risk of:  Goal: Absence of physical injury  Description  Absence of physical injury  Outcome: Met This Shift     Problem: Risk for Impaired Skin Integrity  Goal: Tissue integrity - skin and mucous membranes  Description  Structural intactness and normal physiological function of skin and  mucous membranes.   2/3/2020 1545 by Kurt Díaz RN  Outcome: Met This Shift     Problem: Musculor/Skeletal Functional Status  Goal: Highest potential functional level  Outcome: Met This Shift Patient